# Patient Record
Sex: MALE | Race: WHITE | NOT HISPANIC OR LATINO | URBAN - METROPOLITAN AREA
[De-identification: names, ages, dates, MRNs, and addresses within clinical notes are randomized per-mention and may not be internally consistent; named-entity substitution may affect disease eponyms.]

---

## 2021-02-03 RX ORDER — AZITHROMYCIN 500 MG/1
0 TABLET, FILM COATED ORAL
Qty: 0 | Refills: 0 | DISCHARGE
Start: 2021-02-03 | End: 2021-02-07

## 2021-02-08 ENCOUNTER — INPATIENT (INPATIENT)
Facility: HOSPITAL | Age: 86
LOS: 3 days | Discharge: HOME | DRG: 177 | End: 2021-02-12
Attending: FAMILY MEDICINE | Admitting: FAMILY MEDICINE
Payer: MEDICARE

## 2021-02-08 VITALS
HEART RATE: 63 BPM | SYSTOLIC BLOOD PRESSURE: 133 MMHG | DIASTOLIC BLOOD PRESSURE: 51 MMHG | RESPIRATION RATE: 20 BRPM | TEMPERATURE: 98 F | WEIGHT: 214.95 LBS | OXYGEN SATURATION: 78 %

## 2021-02-08 DIAGNOSIS — U07.1 COVID-19: ICD-10-CM

## 2021-02-08 LAB
ANION GAP SERPL CALC-SCNC: 9 MMOL/L — SIGNIFICANT CHANGE UP (ref 5–17)
APTT BLD: 20.9 SEC — LOW (ref 27.5–35.5)
BASOPHILS # BLD AUTO: 0 K/UL — SIGNIFICANT CHANGE UP (ref 0–0.2)
BASOPHILS NFR BLD AUTO: 0 % — SIGNIFICANT CHANGE UP (ref 0–2)
BUN SERPL-MCNC: 91 MG/DL — HIGH (ref 7–23)
CALCIUM SERPL-MCNC: 9.4 MG/DL — SIGNIFICANT CHANGE UP (ref 8.5–10.1)
CHLORIDE SERPL-SCNC: 113 MMOL/L — HIGH (ref 96–108)
CK SERPL-CCNC: 59 U/L — SIGNIFICANT CHANGE UP (ref 26–308)
CO2 SERPL-SCNC: 19 MMOL/L — LOW (ref 22–31)
CREAT SERPL-MCNC: 2.6 MG/DL — HIGH (ref 0.5–1.3)
EOSINOPHIL # BLD AUTO: 0.26 K/UL — SIGNIFICANT CHANGE UP (ref 0–0.5)
EOSINOPHIL NFR BLD AUTO: 3 % — SIGNIFICANT CHANGE UP (ref 0–6)
GLUCOSE SERPL-MCNC: 199 MG/DL — HIGH (ref 70–99)
HCT VFR BLD CALC: 30.2 % — LOW (ref 39–50)
HGB BLD-MCNC: 9.5 G/DL — LOW (ref 13–17)
INR BLD: 1.04 RATIO — SIGNIFICANT CHANGE UP (ref 0.88–1.16)
LACTATE SERPL-SCNC: 2.8 MMOL/L — HIGH (ref 0.7–2)
LYMPHOCYTES # BLD AUTO: 0.85 K/UL — LOW (ref 1–3.3)
LYMPHOCYTES # BLD AUTO: 10 % — LOW (ref 13–44)
MCHC RBC-ENTMCNC: 28.3 PG — SIGNIFICANT CHANGE UP (ref 27–34)
MCHC RBC-ENTMCNC: 31.5 GM/DL — LOW (ref 32–36)
MCV RBC AUTO: 89.9 FL — SIGNIFICANT CHANGE UP (ref 80–100)
MONOCYTES # BLD AUTO: 0.77 K/UL — SIGNIFICANT CHANGE UP (ref 0–0.9)
MONOCYTES NFR BLD AUTO: 9 % — SIGNIFICANT CHANGE UP (ref 2–14)
NEUTROPHILS # BLD AUTO: 6.31 K/UL — SIGNIFICANT CHANGE UP (ref 1.8–7.4)
NEUTROPHILS NFR BLD AUTO: 72 % — SIGNIFICANT CHANGE UP (ref 43–77)
NRBC # BLD: SIGNIFICANT CHANGE UP /100 WBCS (ref 0–0)
PLATELET # BLD AUTO: 221 K/UL — SIGNIFICANT CHANGE UP (ref 150–400)
POTASSIUM SERPL-MCNC: 5 MMOL/L — SIGNIFICANT CHANGE UP (ref 3.5–5.3)
POTASSIUM SERPL-SCNC: 5 MMOL/L — SIGNIFICANT CHANGE UP (ref 3.5–5.3)
PROTHROM AB SERPL-ACNC: 12 SEC — SIGNIFICANT CHANGE UP (ref 10.6–13.6)
RBC # BLD: 3.36 M/UL — LOW (ref 4.2–5.8)
RBC # FLD: 13.6 % — SIGNIFICANT CHANGE UP (ref 10.3–14.5)
SARS-COV-2 RNA SPEC QL NAA+PROBE: DETECTED
SODIUM SERPL-SCNC: 141 MMOL/L — SIGNIFICANT CHANGE UP (ref 135–145)
WBC # BLD: 8.53 K/UL — SIGNIFICANT CHANGE UP (ref 3.8–10.5)
WBC # FLD AUTO: 8.53 K/UL — SIGNIFICANT CHANGE UP (ref 3.8–10.5)

## 2021-02-08 PROCEDURE — 82728 ASSAY OF FERRITIN: CPT

## 2021-02-08 PROCEDURE — 81001 URINALYSIS AUTO W/SCOPE: CPT

## 2021-02-08 PROCEDURE — 84466 ASSAY OF TRANSFERRIN: CPT

## 2021-02-08 PROCEDURE — 99223 1ST HOSP IP/OBS HIGH 75: CPT | Mod: CS

## 2021-02-08 PROCEDURE — 71250 CT THORAX DX C-: CPT | Mod: 26

## 2021-02-08 PROCEDURE — 85730 THROMBOPLASTIN TIME PARTIAL: CPT

## 2021-02-08 PROCEDURE — 83036 HEMOGLOBIN GLYCOSYLATED A1C: CPT

## 2021-02-08 PROCEDURE — 83550 IRON BINDING TEST: CPT

## 2021-02-08 PROCEDURE — 80053 COMPREHEN METABOLIC PANEL: CPT

## 2021-02-08 PROCEDURE — 93010 ELECTROCARDIOGRAM REPORT: CPT

## 2021-02-08 PROCEDURE — 93970 EXTREMITY STUDY: CPT | Mod: 26

## 2021-02-08 PROCEDURE — 82550 ASSAY OF CK (CPK): CPT

## 2021-02-08 PROCEDURE — 85025 COMPLETE CBC W/AUTO DIFF WBC: CPT

## 2021-02-08 PROCEDURE — 82962 GLUCOSE BLOOD TEST: CPT

## 2021-02-08 PROCEDURE — 74176 CT ABD & PELVIS W/O CONTRAST: CPT | Mod: 26

## 2021-02-08 PROCEDURE — 82746 ASSAY OF FOLIC ACID SERUM: CPT

## 2021-02-08 PROCEDURE — 84100 ASSAY OF PHOSPHORUS: CPT

## 2021-02-08 PROCEDURE — 82607 VITAMIN B-12: CPT

## 2021-02-08 PROCEDURE — 74176 CT ABD & PELVIS W/O CONTRAST: CPT

## 2021-02-08 PROCEDURE — 97116 GAIT TRAINING THERAPY: CPT | Mod: GP

## 2021-02-08 PROCEDURE — 80048 BASIC METABOLIC PNL TOTAL CA: CPT

## 2021-02-08 PROCEDURE — 36415 COLL VENOUS BLD VENIPUNCTURE: CPT

## 2021-02-08 PROCEDURE — 83540 ASSAY OF IRON: CPT

## 2021-02-08 PROCEDURE — 71045 X-RAY EXAM CHEST 1 VIEW: CPT | Mod: 26

## 2021-02-08 PROCEDURE — 86769 SARS-COV-2 COVID-19 ANTIBODY: CPT

## 2021-02-08 PROCEDURE — 93970 EXTREMITY STUDY: CPT

## 2021-02-08 PROCEDURE — 83735 ASSAY OF MAGNESIUM: CPT

## 2021-02-08 PROCEDURE — 83605 ASSAY OF LACTIC ACID: CPT

## 2021-02-08 PROCEDURE — 97163 PT EVAL HIGH COMPLEX 45 MIN: CPT | Mod: GP

## 2021-02-08 RX ORDER — CARVEDILOL PHOSPHATE 80 MG/1
1 CAPSULE, EXTENDED RELEASE ORAL
Qty: 0 | Refills: 0 | DISCHARGE

## 2021-02-08 RX ORDER — IPRATROPIUM BROMIDE 0.2 MG/ML
4 SOLUTION, NON-ORAL INHALATION ONCE
Refills: 0 | Status: COMPLETED | OUTPATIENT
Start: 2021-02-08 | End: 2021-02-08

## 2021-02-08 RX ORDER — SODIUM CHLORIDE 9 MG/ML
1000 INJECTION INTRAMUSCULAR; INTRAVENOUS; SUBCUTANEOUS
Refills: 0 | Status: DISCONTINUED | OUTPATIENT
Start: 2021-02-08 | End: 2021-02-09

## 2021-02-08 RX ORDER — PIOGLITAZONE HYDROCHLORIDE 15 MG/1
1 TABLET ORAL
Qty: 0 | Refills: 0 | DISCHARGE

## 2021-02-08 RX ORDER — TRAVOPROST 0.04 MG/ML
1 SOLUTION/ DROPS OPHTHALMIC
Qty: 0 | Refills: 0 | DISCHARGE

## 2021-02-08 RX ORDER — TIMOLOL 0.5 %
1 DROPS OPHTHALMIC (EYE)
Qty: 0 | Refills: 0 | DISCHARGE

## 2021-02-08 RX ORDER — DEXTROSE 50 % IN WATER 50 %
12.5 SYRINGE (ML) INTRAVENOUS ONCE
Refills: 0 | Status: DISCONTINUED | OUTPATIENT
Start: 2021-02-08 | End: 2021-02-12

## 2021-02-08 RX ORDER — SODIUM CHLORIDE 9 MG/ML
1000 INJECTION, SOLUTION INTRAVENOUS
Refills: 0 | Status: DISCONTINUED | OUTPATIENT
Start: 2021-02-08 | End: 2021-02-12

## 2021-02-08 RX ORDER — ALBUTEROL 90 UG/1
1 AEROSOL, METERED ORAL EVERY 4 HOURS
Refills: 0 | Status: DISCONTINUED | OUTPATIENT
Start: 2021-02-08 | End: 2021-02-12

## 2021-02-08 RX ORDER — INSULIN LISPRO 100/ML
VIAL (ML) SUBCUTANEOUS
Refills: 0 | Status: DISCONTINUED | OUTPATIENT
Start: 2021-02-08 | End: 2021-02-10

## 2021-02-08 RX ORDER — ACETAMINOPHEN 500 MG
650 TABLET ORAL EVERY 4 HOURS
Refills: 0 | Status: DISCONTINUED | OUTPATIENT
Start: 2021-02-08 | End: 2021-02-12

## 2021-02-08 RX ORDER — CARVEDILOL PHOSPHATE 80 MG/1
6.25 CAPSULE, EXTENDED RELEASE ORAL EVERY 12 HOURS
Refills: 0 | Status: DISCONTINUED | OUTPATIENT
Start: 2021-02-08 | End: 2021-02-12

## 2021-02-08 RX ORDER — ATORVASTATIN CALCIUM 80 MG/1
1 TABLET, FILM COATED ORAL
Qty: 0 | Refills: 0 | DISCHARGE

## 2021-02-08 RX ORDER — ALBUTEROL 90 UG/1
4 AEROSOL, METERED ORAL ONCE
Refills: 0 | Status: COMPLETED | OUTPATIENT
Start: 2021-02-08 | End: 2021-02-08

## 2021-02-08 RX ORDER — ATORVASTATIN CALCIUM 80 MG/1
20 TABLET, FILM COATED ORAL AT BEDTIME
Refills: 0 | Status: DISCONTINUED | OUTPATIENT
Start: 2021-02-08 | End: 2021-02-12

## 2021-02-08 RX ORDER — TIMOLOL 0.5 %
1 DROPS OPHTHALMIC (EYE) DAILY
Refills: 0 | Status: DISCONTINUED | OUTPATIENT
Start: 2021-02-08 | End: 2021-02-12

## 2021-02-08 RX ORDER — GLUCAGON INJECTION, SOLUTION 0.5 MG/.1ML
1 INJECTION, SOLUTION SUBCUTANEOUS ONCE
Refills: 0 | Status: DISCONTINUED | OUTPATIENT
Start: 2021-02-08 | End: 2021-02-12

## 2021-02-08 RX ORDER — DEXTROSE 50 % IN WATER 50 %
25 SYRINGE (ML) INTRAVENOUS ONCE
Refills: 0 | Status: DISCONTINUED | OUTPATIENT
Start: 2021-02-08 | End: 2021-02-12

## 2021-02-08 RX ORDER — HEPARIN SODIUM 5000 [USP'U]/ML
5000 INJECTION INTRAVENOUS; SUBCUTANEOUS EVERY 12 HOURS
Refills: 0 | Status: DISCONTINUED | OUTPATIENT
Start: 2021-02-08 | End: 2021-02-09

## 2021-02-08 RX ORDER — LATANOPROST 0.05 MG/ML
1 SOLUTION/ DROPS OPHTHALMIC; TOPICAL AT BEDTIME
Refills: 0 | Status: DISCONTINUED | OUTPATIENT
Start: 2021-02-08 | End: 2021-02-12

## 2021-02-08 RX ORDER — DEXTROSE 50 % IN WATER 50 %
15 SYRINGE (ML) INTRAVENOUS ONCE
Refills: 0 | Status: DISCONTINUED | OUTPATIENT
Start: 2021-02-08 | End: 2021-02-12

## 2021-02-08 RX ORDER — SODIUM CHLORIDE 9 MG/ML
1000 INJECTION INTRAMUSCULAR; INTRAVENOUS; SUBCUTANEOUS ONCE
Refills: 0 | Status: COMPLETED | OUTPATIENT
Start: 2021-02-08 | End: 2021-02-08

## 2021-02-08 RX ORDER — DEXAMETHASONE 0.5 MG/5ML
6 ELIXIR ORAL DAILY
Refills: 0 | Status: DISCONTINUED | OUTPATIENT
Start: 2021-02-08 | End: 2021-02-10

## 2021-02-08 RX ORDER — ASPIRIN/CALCIUM CARB/MAGNESIUM 324 MG
81 TABLET ORAL DAILY
Refills: 0 | Status: DISCONTINUED | OUTPATIENT
Start: 2021-02-08 | End: 2021-02-12

## 2021-02-08 RX ORDER — INSULIN LISPRO 100/ML
VIAL (ML) SUBCUTANEOUS AT BEDTIME
Refills: 0 | Status: DISCONTINUED | OUTPATIENT
Start: 2021-02-08 | End: 2021-02-10

## 2021-02-08 RX ORDER — ASPIRIN/CALCIUM CARB/MAGNESIUM 324 MG
1 TABLET ORAL
Qty: 0 | Refills: 0 | DISCHARGE

## 2021-02-08 RX ADMIN — ALBUTEROL 4 PUFF(S): 90 AEROSOL, METERED ORAL at 16:05

## 2021-02-08 RX ADMIN — CARVEDILOL PHOSPHATE 6.25 MILLIGRAM(S): 80 CAPSULE, EXTENDED RELEASE ORAL at 21:09

## 2021-02-08 RX ADMIN — SODIUM CHLORIDE 1000 MILLILITER(S): 9 INJECTION INTRAMUSCULAR; INTRAVENOUS; SUBCUTANEOUS at 17:06

## 2021-02-08 RX ADMIN — HEPARIN SODIUM 5000 UNIT(S): 5000 INJECTION INTRAVENOUS; SUBCUTANEOUS at 21:09

## 2021-02-08 RX ADMIN — ATORVASTATIN CALCIUM 20 MILLIGRAM(S): 80 TABLET, FILM COATED ORAL at 21:09

## 2021-02-08 RX ADMIN — LATANOPROST 1 DROP(S): 0.05 SOLUTION/ DROPS OPHTHALMIC; TOPICAL at 23:00

## 2021-02-08 RX ADMIN — SODIUM CHLORIDE 75 MILLILITER(S): 9 INJECTION INTRAMUSCULAR; INTRAVENOUS; SUBCUTANEOUS at 21:09

## 2021-02-08 RX ADMIN — Medication 4 PUFF(S): at 16:05

## 2021-02-08 RX ADMIN — SODIUM CHLORIDE 1000 MILLILITER(S): 9 INJECTION INTRAMUSCULAR; INTRAVENOUS; SUBCUTANEOUS at 18:06

## 2021-02-08 NOTE — ED PROVIDER NOTE - OBJECTIVE STATEMENT
84 y/o male with a PMHx of HTN, DM, presents to the ED sent by PCP c/o worsening cough x2 weeks. Pt reports loss of taste and smell. Notes baseline SOB. Pt notes wife at home tested COVID +. Sent to the ED by PCP for chest XR. 02 sat in triage 78% on RA. Denies fever, chest pain. No other complaints at this time. NKDA. 86 y/o male with a PMHx of HTN, DM, presents to the ED sent by PCP c/o worsening cough x2 weeks. Pt reports loss of taste and smell, decreased PO intake, and generalized weakness. Notes baseline SOB. Pt notes wife at home tested COVID +. Sent to the ED by PCP for chest XR. Denies fever, chest pain. No other complaints at this time. NKDA.

## 2021-02-08 NOTE — ED PROVIDER NOTE - CHPI ED SYMPTOMS POS
+loss of taste and smell/COUGH/SHORTNESS OF BREATH +loss of taste and smell +decreased PO intake +generalized weakness/COUGH/SHORTNESS OF BREATH

## 2021-02-08 NOTE — H&P ADULT - ASSESSMENT
86 yo male with PMH of DM2, HTN, HLD admitted with:    #probable COVID 19 PNA  #Acute hypoxic respiratory failure (SpO2 78% on RA documented on arrival to ED)  - admit to med-surg  - currently SpO2 93-94% on RA, O2 via NC PRN to maintain SPO2 >92%  - start PO decadron  - no remdesevir for now given CrCl <30  - ID consult  - lactic acidosis noted, trend lactate (?sepsis from COVID with decreased clearance given OPAL vs metformin use)  - d-dimer 91296, check LE dopplers  - f/u procalcitonin    #OPAL  - hold losartan  - IV fluids  - nephro consult  - CT abdomen pending to r/o obstruction  - UA  - avoid nephrotoxic medications    #normocytic anemia  - monitor H/H  - stool occult negative  - check iron panel, B12, folate    #DM2  - hold metformin  - ISS  - hold actos    #HTN  - losartan held secondary to OPAL  - continue coreg    #DVT prophylaxis  - heparin sq    #Advance Directives  - FULL CODE, discussed with patient      IMPROVE VTE Individual Risk Assessment    RISK                                                                Points    [  ] Previous VTE                                                  3    [  ] Thrombophilia                                               2    [  ] Lower limb paralysis                                      2        (unable to hold up >15 seconds)      [  ] Current Cancer                                              2         (within 6 months)    [  ] Immobilization > 24 hrs                                1    [  ] ICU/CCU stay > 24 hours                              1    [x  ] Age > 60                                                      1    IMPROVE VTE Score ___1______    IMPROVE Score 0-1: Low Risk, No VTE prophylaxis required for most patients, encourage ambulation.   IMPROVE Score 2-3: At risk, pharmacologic VTE prophylaxis is indicated for most patients (in the absence of a contraindication)  IMPROVE Score > or = 4: High Risk, pharmacologic VTE prophylaxis is indicated for most patients (in the absence of a contraindication)

## 2021-02-08 NOTE — H&P ADULT - NSHPPHYSICALEXAM_GEN_ALL_CORE
Vital Signs Last 24 Hrs  T(C): 37.2 (08 Feb 2021 15:24), Max: 37.2 (08 Feb 2021 15:24)  T(F): 98.9 (08 Feb 2021 15:24), Max: 98.9 (08 Feb 2021 15:24)  HR: 84 (08 Feb 2021 15:24) (63 - 84)  BP: 133/51 (08 Feb 2021 15:24) (133/51 - 133/51)  BP(mean): --  RR: 19 (08 Feb 2021 15:25) (19 - 20)  SpO2: 96% (08 Feb 2021 15:25) (78% - 99%)

## 2021-02-08 NOTE — H&P ADULT - HISTORY OF PRESENT ILLNESS
86 yo male with PMH of DM2, HTN, HLD presents to ED with complaint of weakness and cough. Pt states he has been having a cough and low grade fevers since January. Feels mild SOB. Cough is non productive. States fever at home was 101. He had a decreased appetite but states has improved in the last 2 days. Has loss of taste and smell. Wife at home positive with COVID. Son also recently had COVID. Pt states he was drinking water at home but maybe not enough. No trouble urinating. No dark urine. Denies any abd pain, N/V/D. No melena or hematochezia. Pt states overall feels very weak and tired.     In the ED patient with no fever. There was a documented SpO2 of 78% on RA. On my exam patient with no O2 on and O2 sat 93-94% at rest. He was also noted to be anemic with hgb of 9.5 (guaiac negative) and OPAL with BUN/Cr of 91/2.6. He was given 1L of NS. CXR shows infiltrates. CT chest shows ground glass opacification. ER spoke with PCP in [facility] Dr. Bandar Larry ([phone]) and stated patient with no history of CKD or anemia in the past.

## 2021-02-08 NOTE — H&P ADULT - NSHPSOCIALHISTORY_GEN_ALL_CORE
former cigar smoker, quit 10 yrs ago  former drinker quit 6 yrs ago  no drug use  lives at home with wife

## 2021-02-08 NOTE — ED PROVIDER NOTE - CLINICAL SUMMARY MEDICAL DECISION MAKING FREE TEXT BOX
Pt with COVID + spouse, sick for 2 weeks with worsening cough, decreased oral intake, weakness. Sent in by PMD for chest XR. Pt now found to be in renal failure, anemic, with right-sided infiltrate. Labs, hydration, admission.

## 2021-02-08 NOTE — ED ADULT TRIAGE NOTE - CHIEF COMPLAINT QUOTE
Patient presents in ED with worsening cough and congestion. Patient sent by MD to r/o Pneumonia. Patient family Covid positive. 02 saturation in triage 78% on room air. Patient direct bedded into main.

## 2021-02-08 NOTE — ED PROVIDER NOTE - CARE PLAN
Principal Discharge DX:	COVID-19  Secondary Diagnosis:	OPAL (acute kidney injury)  Secondary Diagnosis:	Anemia

## 2021-02-08 NOTE — ED ADULT NURSE REASSESSMENT NOTE - NS ED NURSE REASSESS COMMENT FT1
report received from Murtaza miller. pt in no acute distress at this time. on the monitor, will sejal

## 2021-02-08 NOTE — ED PROVIDER NOTE - PROGRESS NOTE DETAILS
84 y/o M presents with cough x 2 weeks. Pt reports persistent cough for the last 2 weeks. He had a fever initially which has since subsided. +Loss of taste/smell. pt feels baseline SOB. Wife + with covid at home. Sent to ED by PMD for cxr. Denies fever/chills, n/v/d, Cp, abd, pain, HA, dizziness, leg swelling or other complaints at this time.  Gen: well appearing elderly male, A+ox3, heart:[id number]. Lung CTA B/l. abd: soft, nd, nttp. Ext: No pedal edema. -Magno Alegria PA-C I ambulated pt, O2 sat maintained at % on RA. -Magno Alegria PA-C I Zacarias Christopher attest that this documentation has been prepared under the direction and in the presence of Doctor Michael. Leatha Christopher for attending Dr. Michael: wet read Chest XR: +right-sided infiltrate Leatha Christopher for attending Dr. Michael: Spoke to PCP who is in [place], Dr. Bandar Larry ([phone]), states pt has no Hx of kidney disease, no Hx of low hemoglobin. Pt pulse ox reportedly 79% at home. Leatha Christopher for attending Dr. Clements: wet read Chest XR: +right-sided infiltrate Leatha Christopher for attending Dr. Clements: Spoke to PCP who is in [place], Dr. Bandar Larry ([phone]), states pt has no Hx of kidney disease, no Hx of low hemoglobin. Pt pulse ox reportedly 79% at home. I Zacarias Christopher attest that this documentation has been prepared under the direction and in the presence of Doctor Clements Leatha Christopher for attending Dr. Clements: Rectal exam: Lot [id number], expiration 09/30/2021: Guaiac negative, QC +. Case discussed with Dr. hung who will admit pt. -Magno Alegria PA-C Received call from Dr. Sotelo that pt has left gastroc dvt. Dr. Wolff called to [facility]. Pt is already enroute to his room. Alec ALVAREZ

## 2021-02-09 PROBLEM — Z00.00 ENCOUNTER FOR PREVENTIVE HEALTH EXAMINATION: Status: ACTIVE | Noted: 2021-02-09

## 2021-02-09 LAB
A1C WITH ESTIMATED AVERAGE GLUCOSE RESULT: 6.2 % — HIGH (ref 4–5.6)
ALBUMIN SERPL ELPH-MCNC: 2 G/DL — LOW (ref 3.3–5)
ALP SERPL-CCNC: 73 U/L — SIGNIFICANT CHANGE UP (ref 40–120)
ALT FLD-CCNC: 32 U/L — SIGNIFICANT CHANGE UP (ref 12–78)
ANION GAP SERPL CALC-SCNC: 8 MMOL/L — SIGNIFICANT CHANGE UP (ref 5–17)
APPEARANCE UR: CLEAR — SIGNIFICANT CHANGE UP
APTT BLD: 157.1 SEC — CRITICAL HIGH (ref 27.5–35.5)
AST SERPL-CCNC: 51 U/L — HIGH (ref 15–37)
BASOPHILS # BLD AUTO: 0.02 K/UL — SIGNIFICANT CHANGE UP (ref 0–0.2)
BASOPHILS NFR BLD AUTO: 0.3 % — SIGNIFICANT CHANGE UP (ref 0–2)
BILIRUB SERPL-MCNC: 0.3 MG/DL — SIGNIFICANT CHANGE UP (ref 0.2–1.2)
BILIRUB UR-MCNC: NEGATIVE — SIGNIFICANT CHANGE UP
BUN SERPL-MCNC: 76 MG/DL — HIGH (ref 7–23)
CALCIUM SERPL-MCNC: 8.4 MG/DL — LOW (ref 8.5–10.1)
CHLORIDE SERPL-SCNC: 118 MMOL/L — HIGH (ref 96–108)
CO2 SERPL-SCNC: 19 MMOL/L — LOW (ref 22–31)
COLOR SPEC: YELLOW — SIGNIFICANT CHANGE UP
CREAT SERPL-MCNC: 1.93 MG/DL — HIGH (ref 0.5–1.3)
CRP SERPL-MCNC: 2.02 MG/DL — HIGH (ref 0–0.4)
DIFF PNL FLD: NEGATIVE — SIGNIFICANT CHANGE UP
EOSINOPHIL # BLD AUTO: 0.01 K/UL — SIGNIFICANT CHANGE UP (ref 0–0.5)
EOSINOPHIL NFR BLD AUTO: 0.2 % — SIGNIFICANT CHANGE UP (ref 0–6)
ESTIMATED AVERAGE GLUCOSE: 131 MG/DL — HIGH (ref 68–114)
FERRITIN SERPL-MCNC: 774 NG/ML — HIGH (ref 30–400)
FERRITIN SERPL-MCNC: 775 NG/ML — HIGH (ref 30–400)
FOLATE SERPL-MCNC: 9.2 NG/ML — SIGNIFICANT CHANGE UP
GLUCOSE SERPL-MCNC: 179 MG/DL — HIGH (ref 70–99)
GLUCOSE UR QL: NEGATIVE MG/DL — SIGNIFICANT CHANGE UP
HCT VFR BLD CALC: 25.8 % — LOW (ref 39–50)
HGB BLD-MCNC: 8.1 G/DL — LOW (ref 13–17)
IMM GRANULOCYTES NFR BLD AUTO: 7.4 % — HIGH (ref 0–1.5)
IRON SATN MFR SERPL: 20 % — SIGNIFICANT CHANGE UP (ref 16–55)
IRON SATN MFR SERPL: 40 UG/DL — LOW (ref 45–165)
KETONES UR-MCNC: NEGATIVE — SIGNIFICANT CHANGE UP
LEUKOCYTE ESTERASE UR-ACNC: NEGATIVE — SIGNIFICANT CHANGE UP
LYMPHOCYTES # BLD AUTO: 0.63 K/UL — LOW (ref 1–3.3)
LYMPHOCYTES # BLD AUTO: 9.8 % — LOW (ref 13–44)
MAGNESIUM SERPL-MCNC: 2 MG/DL — SIGNIFICANT CHANGE UP (ref 1.6–2.6)
MCHC RBC-ENTMCNC: 28.1 PG — SIGNIFICANT CHANGE UP (ref 27–34)
MCHC RBC-ENTMCNC: 31.4 GM/DL — LOW (ref 32–36)
MCV RBC AUTO: 89.6 FL — SIGNIFICANT CHANGE UP (ref 80–100)
MONOCYTES # BLD AUTO: 0.21 K/UL — SIGNIFICANT CHANGE UP (ref 0–0.9)
MONOCYTES NFR BLD AUTO: 3.3 % — SIGNIFICANT CHANGE UP (ref 2–14)
NEUTROPHILS # BLD AUTO: 5.11 K/UL — SIGNIFICANT CHANGE UP (ref 1.8–7.4)
NEUTROPHILS NFR BLD AUTO: 79 % — HIGH (ref 43–77)
NITRITE UR-MCNC: NEGATIVE — SIGNIFICANT CHANGE UP
PH UR: 6 — SIGNIFICANT CHANGE UP (ref 5–8)
PHOSPHATE SERPL-MCNC: 3.4 MG/DL — SIGNIFICANT CHANGE UP (ref 2.5–4.5)
PLATELET # BLD AUTO: 200 K/UL — SIGNIFICANT CHANGE UP (ref 150–400)
POTASSIUM SERPL-MCNC: 4.7 MMOL/L — SIGNIFICANT CHANGE UP (ref 3.5–5.3)
POTASSIUM SERPL-SCNC: 4.7 MMOL/L — SIGNIFICANT CHANGE UP (ref 3.5–5.3)
PROCALCITONIN SERPL-MCNC: 0.71 NG/ML — HIGH (ref 0.02–0.1)
PROT SERPL-MCNC: 5.8 GM/DL — LOW (ref 6–8.3)
PROT UR-MCNC: 15 MG/DL
RBC # BLD: 2.88 M/UL — LOW (ref 4.2–5.8)
RBC # FLD: 13.5 % — SIGNIFICANT CHANGE UP (ref 10.3–14.5)
SARS-COV-2 IGG SERPL QL IA: POSITIVE
SARS-COV-2 IGM SERPL IA-ACNC: 14.1 INDEX — HIGH
SODIUM SERPL-SCNC: 145 MMOL/L — SIGNIFICANT CHANGE UP (ref 135–145)
SP GR SPEC: 1.02 — SIGNIFICANT CHANGE UP (ref 1.01–1.02)
TIBC SERPL-MCNC: 199 UG/DL — LOW (ref 220–430)
TRANSFERRIN SERPL-MCNC: 165 MG/DL — LOW (ref 200–360)
UIBC SERPL-MCNC: 160 UG/DL — SIGNIFICANT CHANGE UP (ref 110–370)
UROBILINOGEN FLD QL: NEGATIVE MG/DL — SIGNIFICANT CHANGE UP
VIT B12 SERPL-MCNC: 971 PG/ML — SIGNIFICANT CHANGE UP (ref 232–1245)
WBC # BLD: 6.46 K/UL — SIGNIFICANT CHANGE UP (ref 3.8–10.5)
WBC # FLD AUTO: 6.46 K/UL — SIGNIFICANT CHANGE UP (ref 3.8–10.5)

## 2021-02-09 PROCEDURE — 99233 SBSQ HOSP IP/OBS HIGH 50: CPT | Mod: CS

## 2021-02-09 RX ORDER — HEPARIN SODIUM 5000 [USP'U]/ML
3500 INJECTION INTRAVENOUS; SUBCUTANEOUS EVERY 6 HOURS
Refills: 0 | Status: DISCONTINUED | OUTPATIENT
Start: 2021-02-09 | End: 2021-02-09

## 2021-02-09 RX ORDER — HEPARIN SODIUM 5000 [USP'U]/ML
8000 INJECTION INTRAVENOUS; SUBCUTANEOUS EVERY 6 HOURS
Refills: 0 | Status: DISCONTINUED | OUTPATIENT
Start: 2021-02-09 | End: 2021-02-09

## 2021-02-09 RX ORDER — APIXABAN 2.5 MG/1
5 TABLET, FILM COATED ORAL
Refills: 0 | Status: DISCONTINUED | OUTPATIENT
Start: 2021-02-09 | End: 2021-02-09

## 2021-02-09 RX ORDER — HEPARIN SODIUM 5000 [USP'U]/ML
7000 INJECTION INTRAVENOUS; SUBCUTANEOUS ONCE
Refills: 0 | Status: COMPLETED | OUTPATIENT
Start: 2021-02-09 | End: 2021-02-09

## 2021-02-09 RX ORDER — HEPARIN SODIUM 5000 [USP'U]/ML
8000 INJECTION INTRAVENOUS; SUBCUTANEOUS ONCE
Refills: 0 | Status: DISCONTINUED | OUTPATIENT
Start: 2021-02-09 | End: 2021-02-09

## 2021-02-09 RX ORDER — APIXABAN 2.5 MG/1
5 TABLET, FILM COATED ORAL
Refills: 0 | Status: CANCELLED | OUTPATIENT
Start: 2021-02-16 | End: 2021-02-12

## 2021-02-09 RX ORDER — HEPARIN SODIUM 5000 [USP'U]/ML
4000 INJECTION INTRAVENOUS; SUBCUTANEOUS EVERY 6 HOURS
Refills: 0 | Status: DISCONTINUED | OUTPATIENT
Start: 2021-02-09 | End: 2021-02-09

## 2021-02-09 RX ORDER — HEPARIN SODIUM 5000 [USP'U]/ML
INJECTION INTRAVENOUS; SUBCUTANEOUS
Qty: 25000 | Refills: 0 | Status: DISCONTINUED | OUTPATIENT
Start: 2021-02-09 | End: 2021-02-09

## 2021-02-09 RX ORDER — HEPARIN SODIUM 5000 [USP'U]/ML
7000 INJECTION INTRAVENOUS; SUBCUTANEOUS EVERY 6 HOURS
Refills: 0 | Status: DISCONTINUED | OUTPATIENT
Start: 2021-02-09 | End: 2021-02-09

## 2021-02-09 RX ORDER — SODIUM CHLORIDE 9 MG/ML
1000 INJECTION INTRAMUSCULAR; INTRAVENOUS; SUBCUTANEOUS
Refills: 0 | Status: DISCONTINUED | OUTPATIENT
Start: 2021-02-09 | End: 2021-02-10

## 2021-02-09 RX ORDER — APIXABAN 2.5 MG/1
10 TABLET, FILM COATED ORAL EVERY 12 HOURS
Refills: 0 | Status: DISCONTINUED | OUTPATIENT
Start: 2021-02-09 | End: 2021-02-12

## 2021-02-09 RX ADMIN — HEPARIN SODIUM 1300 UNIT(S)/HR: 5000 INJECTION INTRAVENOUS; SUBCUTANEOUS at 14:24

## 2021-02-09 RX ADMIN — HEPARIN SODIUM 1600 UNIT(S)/HR: 5000 INJECTION INTRAVENOUS; SUBCUTANEOUS at 02:03

## 2021-02-09 RX ADMIN — Medication 1: at 17:18

## 2021-02-09 RX ADMIN — Medication 2: at 08:07

## 2021-02-09 RX ADMIN — HEPARIN SODIUM 7000 UNIT(S): 5000 INJECTION INTRAVENOUS; SUBCUTANEOUS at 02:04

## 2021-02-09 RX ADMIN — APIXABAN 10 MILLIGRAM(S): 2.5 TABLET, FILM COATED ORAL at 20:57

## 2021-02-09 RX ADMIN — Medication 1 DROP(S): at 13:13

## 2021-02-09 RX ADMIN — Medication 6 MILLIGRAM(S): at 14:28

## 2021-02-09 RX ADMIN — Medication 2: at 20:56

## 2021-02-09 RX ADMIN — CARVEDILOL PHOSPHATE 6.25 MILLIGRAM(S): 80 CAPSULE, EXTENDED RELEASE ORAL at 12:00

## 2021-02-09 RX ADMIN — ATORVASTATIN CALCIUM 20 MILLIGRAM(S): 80 TABLET, FILM COATED ORAL at 20:57

## 2021-02-09 RX ADMIN — CARVEDILOL PHOSPHATE 6.25 MILLIGRAM(S): 80 CAPSULE, EXTENDED RELEASE ORAL at 20:58

## 2021-02-09 RX ADMIN — HEPARIN SODIUM 0 UNIT(S)/HR: 5000 INJECTION INTRAVENOUS; SUBCUTANEOUS at 13:15

## 2021-02-09 RX ADMIN — LATANOPROST 1 DROP(S): 0.05 SOLUTION/ DROPS OPHTHALMIC; TOPICAL at 20:57

## 2021-02-09 RX ADMIN — Medication 1: at 11:58

## 2021-02-09 RX ADMIN — Medication 81 MILLIGRAM(S): at 12:00

## 2021-02-09 NOTE — CONSULT NOTE ADULT - SUBJECTIVE AND OBJECTIVE BOX
Patient is a 85y old  Male who presents with a chief complaint of OPAL     HPI:  84 y/o male with h/o DM type 2, HTN, HLD was admitted on  for increased weakness and cough. Pt states he has been having a cough and low grade fevers x 1-2 weeks. Feels mild SOB. Cough is non productive. States fever at home was 101F. He had a decreased appetite. Has loss of taste and smell. Pt states overall feels very weak and tired. In the ED patient was a documented SpO2 of 78% on RA.     ER spoke with PCP in [facility] Dr. Bandar Larry ([phone]) and stated patient with no history of CKD or anemia in the past.     PMH: as above  PSH: as above  Meds: per reconciliation sheet, noted below  MEDICATIONS  (STANDING):  ALBUTerol    90 MICROgram(s) HFA Inhaler 1 Puff(s) Inhalation every 4 hours  aspirin enteric coated 81 milliGRAM(s) Oral daily  atorvastatin 20 milliGRAM(s) Oral at bedtime  carvedilol 6.25 milliGRAM(s) Oral every 12 hours  dexAMETHasone     Tablet 6 milliGRAM(s) Oral daily  dextrose 40% Gel 15 Gram(s) Oral once  dextrose 5%. 1000 milliLiter(s) (50 mL/Hr) IV Continuous <Continuous>  dextrose 5%. 1000 milliLiter(s) (100 mL/Hr) IV Continuous <Continuous>  dextrose 50% Injectable 25 Gram(s) IV Push once  dextrose 50% Injectable 12.5 Gram(s) IV Push once  dextrose 50% Injectable 25 Gram(s) IV Push once  glucagon  Injectable 1 milliGRAM(s) IntraMuscular once  heparin  Infusion.  Unit(s)/Hr (16 mL/Hr) IV Continuous <Continuous>  insulin lispro (ADMELOG) corrective regimen sliding scale   SubCutaneous three times a day before meals  insulin lispro (ADMELOG) corrective regimen sliding scale   SubCutaneous at bedtime  latanoprost 0.005% Ophthalmic Solution 1 Drop(s) Both EYES at bedtime  sodium chloride 0.9%. 1000 milliLiter(s) (75 mL/Hr) IV Continuous <Continuous>  timolol 0.5% Solution 1 Drop(s) Right EYE daily    MEDICATIONS  (PRN):  acetaminophen   Tablet .. 650 milliGRAM(s) Oral every 4 hours PRN Temp greater or equal to 38C (100.4F), Mild Pain (1 - 3)  heparin   Injectable 7000 Unit(s) IV Push every 6 hours PRN For aPTT less than 40  heparin   Injectable 3500 Unit(s) IV Push every 6 hours PRN For aPTT between 40 - 57    Allergies    No Known Allergies    Intolerances      Social: no smoking, no alcohol, no illegal drugs; no recent travel, no exposure to TB  Wife at home positive with COVID. Son also recently had COVID.   FAMILY HISTORY:  No pertinent family history in first degree relatives      no history of premature cardiovascular disease in first degree relatives    ROS: the patient denies fever, no chills, no HA, no seizures, no dizziness, no sore throat, no nasal congestion, no blurry vision, no CP, no palpitations, has SOB, has cough, no abdominal pain, no diarrhea, no N/V, no dysuria, no leg pain, no claudication, no rash, no joint aches, no rectal pain or bleeding, no night sweats  All other systems reviewed and are negative    Vital Signs Last 24 Hrs  T(C): 36.2 (2021 01:25), Max: 37.2 (2021 15:24)  T(F): 97.1 (2021 01:25), Max: 98.9 (2021 15:24)  HR: 74 (2021 01:25) (63 - 95)  BP: 155/71 (2021 01:25) (133/51 - 158/62)  BP(mean): 87 (2021 20:37) (87 - 87)  RR: 19 (2021 01:25) (19 - 21)  SpO2: 99% (2021 01:25) (78% - 99%)  Daily     Daily     PE:    Constitutional:  No acute distress  HEENT: NC/AT, EOMI, PERRLA, conjunctivae clear; ears and nose atraumatic; pharynx benign  Neck: supple; thyroid not palpable  Back: no tenderness  Respiratory: respiratory effort normal; crackles b/l  Cardiovascular: S1S2 regular, no murmurs  Abdomen: soft, not tender, not distended, positive BS; no liver or spleen organomegaly  Genitourinary: no suprapubic tenderness  Lymphatic: no LN palpable  Musculoskeletal: no muscle tenderness, no joint swelling or tenderness  Extremities: no pedal edema  Neurological/ Psychiatric: AxOx3, judgement and insight normal; moving all extremities  Skin: no rashes; no palpable lesions    Labs: all available labs reviewed                        8.1    6.46  )-----------( 200      ( 2021 07:41 )             25.8     02-    145  |  118<H>  |  76<H>  ----------------------------<  179<H>  4.7   |  19<L>  |  1.93<H>    Ca    8.4<L>      2021 07:41  Phos  3.4       Mg     2.0         TPro  5.8<L>  /  Alb  2.0<L>  /  TBili  0.3  /  DBili  x   /  AST  51<H>  /  ALT  32  /  AlkPhos  73       LIVER FUNCTIONS - ( 2021 07:41 )  Alb: 2.0 g/dL / Pro: 5.8 gm/dL / ALK PHOS: 73 U/L / ALT: 32 U/L / AST: 51 U/L / GGT: x           Urinalysis Basic - ( 2021 05:46 )    Color: Yellow / Appearance: Clear / S.020 / pH: x  Gluc: x / Ketone: Negative  / Bili: Negative / Urobili: Negative mg/dL   Blood: x / Protein: 15 mg/dL / Nitrite: Negative   Leuk Esterase: Negative / RBC: Negative /HPF / WBC Negative   Sq Epi: x / Non Sq Epi: Moderate / Bacteria: Many    COVID-19 PCR: Detected (21 @ 16:43)      Radiology: all available radiological tests reviewed    < from: CT Abdomen and Pelvis No Cont (21 @ 20:52) >  Bilateral groundglass opacities in the lung consistent with COVID 19.  No evidence of hydronephrosis or hydroureter.  Fat-containing periumbilical hernia and bilateral inguinal hernias (RIGHT greater than LEFT).  Elongated linear calcification within the SMA approximately 3 cm from its origin.    < end of copied text >      Advanced directives addressed: full resuscitation
NEPHROLOGY CONSULT  HPI:  84 yo male with PMH of DM2, HTN, HLD presents to ED with complaint of weakness and cough. Pt states he has been having a cough and low grade fevers since January. Feels mild SOB. Cough is non productive. States fever at home was 101. He had a decreased appetite but states has improved in the last 2 days. Has loss of taste and smell. Wife at home positive with COVID. Son also recently had COVID. Pt states he was drinking water at home but maybe not enough. No trouble urinating. No dark urine. Denies any abd pain, N/V/D. No melena or hematochezia. Pt states overall feels very weak and tired.     In the ED patient with no fever. There was a documented SpO2 of 78% on RA. On my exam patient with no O2 on and O2 sat 93-94% at rest. He was also noted to be anemic with hgb of 9.5 (guaiac negative) and OPAL with BUN/Cr of 91/2.6. He was given 1L of NS. CXR shows infiltrates. CT chest shows ground glass opacification. ER spoke with PCP in [facility] Dr. Bandar Larry ([phone]) and stated patient with no history of CKD or anemia in the past.  (2021 19:09)  Above from chart:  Pt asymptomatic, no sob, no cough at this time  excellent UO,VVS  I spoke to Dr Larry as well, he will call back with details , he did not have access to his chart      PAST MEDICAL & SURGICAL HISTORY:  Hyperlipidemia    Hypertension    Type 2 diabetes mellitus        FAMILY HISTORY:  No pertinent family history in first degree relatives        MEDICATIONS  (STANDING):  ALBUTerol    90 MICROgram(s) HFA Inhaler 1 Puff(s) Inhalation every 4 hours  aspirin enteric coated 81 milliGRAM(s) Oral daily  atorvastatin 20 milliGRAM(s) Oral at bedtime  carvedilol 6.25 milliGRAM(s) Oral every 12 hours  dexAMETHasone     Tablet 6 milliGRAM(s) Oral daily  dextrose 40% Gel 15 Gram(s) Oral once  dextrose 5%. 1000 milliLiter(s) (50 mL/Hr) IV Continuous <Continuous>  dextrose 5%. 1000 milliLiter(s) (100 mL/Hr) IV Continuous <Continuous>  dextrose 50% Injectable 25 Gram(s) IV Push once  dextrose 50% Injectable 12.5 Gram(s) IV Push once  dextrose 50% Injectable 25 Gram(s) IV Push once  glucagon  Injectable 1 milliGRAM(s) IntraMuscular once  heparin  Infusion.  Unit(s)/Hr (16 mL/Hr) IV Continuous <Continuous>  insulin lispro (ADMELOG) corrective regimen sliding scale   SubCutaneous three times a day before meals  insulin lispro (ADMELOG) corrective regimen sliding scale   SubCutaneous at bedtime  latanoprost 0.005% Ophthalmic Solution 1 Drop(s) Both EYES at bedtime  sodium chloride 0.9%. 1000 milliLiter(s) (75 mL/Hr) IV Continuous <Continuous>  timolol 0.5% Solution 1 Drop(s) Right EYE daily    MEDICATIONS  (PRN):  acetaminophen   Tablet .. 650 milliGRAM(s) Oral every 4 hours PRN Temp greater or equal to 38C (100.4F), Mild Pain (1 - 3)  heparin   Injectable 7000 Unit(s) IV Push every 6 hours PRN For aPTT less than 40  heparin   Injectable 3500 Unit(s) IV Push every 6 hours PRN For aPTT between 40 - 57      Allergies    No Known Allergies    Intolerances        I&O's Summary    2021 07:01  -  2021 07:00  --------------------------------------------------------  IN: 0 mL / OUT: 200 mL / NET: -200 mL          REVIEW OF SYSTEMS:    CONSTITUTIONAL:  As per HPI.  CONSTITUTIONAL: No weakness, fevers or chills  EYES/ENT: No visual changes;  No vertigo or throat pain   NECK: No pain or stiffness  CARDIOVASCULAR: No chest pain or palpitations  GASTROINTESTINAL: No abdominal or epigastric pain. No nausea, vomiting, or hematemesis; No diarrhea or constipation. No melena or hematochezia.  GENITOURINARY: No dysuria, frequency or hematuria  NEUROLOGICAL: No numbness or weakness  SKIN: No itching, burning, rashes, or lesions   All other review of systems is negative unless indicated above      Vital Signs Last 24 Hrs  T(C): 36.2 (2021 01:25), Max: 37.2 (2021 15:24)  T(F): 97.1 (2021 01:25), Max: 98.9 (2021 15:24)  HR: 74 (:) (63 - 95)  BP: 155/71 (:25) (133/51 - 158/62)  BP(mean): 87 (2021 20:37) (87 - 87)  RR: 19 (:) (19 - 21)  SpO2: 99% (:) (78% - 99%)  Daily     Daily   I&O's Summary    2021 07:01  -  2021 07:00  --------------------------------------------------------  IN: 0 mL / OUT: 200 mL / NET: -200 mL        PHYSICAL EXAM:    General:  Alert, well-developed ,No acute distress.    Neuro:  Alert and oriented to person, place, and time. Able to communicate  well. Cranial nerves 2-12 grossly intact. 5/5 strength in all  extremities bilaterally. Sensation intact in all extremities.  Appropriate affect.     HEENT:  No JVD, no masses, Eyes anicteric, No carotid bruits.No lymphadenopathy,    Cardiovascular:  Regular rate and rhythm, with normal S1 and S2. No murmurs, rubs,  or gallops. No JVD.     Lungs:  clear. no rales, no wheezing, .    Abdomen:  Normoactive bowel sounds. Soft, flat, non-tender, and non-distended.  No hepatosplenomegaly, positive bowel sounds    Skin:  Warm, dry, well-perfused. No rashes or other lesions.     Extremities:  2+ pulses in upper and lower extremities. No lower extremity pain or  edema; legs are symmetric in appearance.    LABS:                        8.1    6.46  )-----------( 200      ( 2021 07:41 )             25.8     02-09    145  |  118<H>  |  76<H>  ----------------------------<  179<H>  4.7   |  19<L>  |  1.93<H>    Ca    8.4<L>      2021 07:41  Phos  3.4       Mg     2.0         TPro  5.8<L>  /  Alb  2.0<L>  /  TBili  0.3  /  DBili  x   /  AST  51<H>  /  ALT  32  /  AlkPhos  73      PT/INR - ( 2021 16:43 )   PT: 12.0 sec;   INR: 1.04 ratio         PTT - ( 2021 10:21 )  PTT:> 200 sec  Urinalysis Basic - ( 2021 05:46 )    Color: Yellow / Appearance: Clear / S.020 / pH: x  Gluc: x / Ketone: Negative  / Bili: Negative / Urobili: Negative mg/dL   Blood: x / Protein: 15 mg/dL / Nitrite: Negative   Leuk Esterase: Negative / RBC: Negative /HPF / WBC Negative   Sq Epi: x / Non Sq Epi: Moderate / Bacteria: Many      Magnesium, Serum: 2.0 mg/dL ( @ 07:41)  Phosphorus Level, Serum: 3.4 mg/dL ( @ 07:41)    Duplex LE : Left gastrocnemius Vein thrombosis

## 2021-02-09 NOTE — CONSULT NOTE ADULT - ASSESSMENT
84 y/o male with h/o DM type 2, HTN, HLD was admitted on 2/8 for increased weakness and cough. Pt states he has been having a cough and low grade fevers x 1-2 weeks. Feels mild SOB. Cough is non productive. States fever at home was 101F. He had a decreased appetite. Has loss of taste and smell. Pt states overall feels very weak and tired. In the ED patient was a documented SpO2 of 78% on RA.     1. Acute respiratory failure. COVID-19 viral syndrome. Multifocal pneumonia. ARF. Left leg DVT.  -obtain BC x 2  -respiratory frail  -dose not qualify for remdesivir due to acute renal failure  -O2 therapy  -AC  -respiratory care  -inflammatory markers are elevated  -droplet isolation  -old chart reviewed to assess prior cultures  -monitor temps  -f/u CBC  -supportive care  2. Other issues:   -care per medicine

## 2021-02-09 NOTE — CONSULT NOTE ADULT - ASSESSMENT
86 yo male with PMH of DM2, HTN, HLD presents to ED with complaint of weakness and cough. Pt states he has been having a cough and low grade fevers since January. Feels mild SOB. Cough is non productive. States fever at home was 101. He had a decreased appetite but states has improved in the last 2 days. Has loss of taste and smell. Wife at home positive with COVID. Son also recently had COVID. Pt states he was drinking water at home but maybe not enough. No trouble urinating. No dark urine. Denies any abd pain, N/V/D. No melena or hematochezia. Pt states overall feels very weak and tired.   In the ED patient with no fever. There was a documented SpO2 of 78% on RA. On my exam patient with no O2 on and O2 sat 93-94% at rest. He was also noted to be anemic with hgb of 9.5 (guaiac negative) and OPAL with BUN/Cr of 91/2.6. He was given 1L of NS. CXR shows infiltrates. CT chest shows ground glass opacification. ER spoke with PCP in [facility] Dr. Bandar Larry ([phone]) and stated patient with no history of CKD or anemia in the past.  (08 Feb 2021 19:09)  Above from chart:  Pt asymptomatic, no sob, no cough at this time  excellent UO,VVS  I spoke to Dr Larry as well, he will call back with details , he did not have access to his chart  Most likely pt w COVID PNA, OPAL, possible baseline CKD?  Anemia exacerbated by IVF and dilutional  will follow

## 2021-02-09 NOTE — PROVIDER CONTACT NOTE (CRITICAL VALUE NOTIFICATION) - BACKGROUND
Pt admit +COVID with + left gastrocnemius vein thrombosis. Pt ws on heparin drip, but d/delaney earlier today.

## 2021-02-09 NOTE — PROVIDER CONTACT NOTE (CRITICAL VALUE NOTIFICATION) - SITUATION
Lab called to notify of results, stated test needed to be re-run, critical called at 1317, not at 1020 draw time
Pt noted to have critical aPTT 157.1.

## 2021-02-10 LAB
ANION GAP SERPL CALC-SCNC: 7 MMOL/L — SIGNIFICANT CHANGE UP (ref 5–17)
BASOPHILS # BLD AUTO: 0.01 K/UL — SIGNIFICANT CHANGE UP (ref 0–0.2)
BASOPHILS NFR BLD AUTO: 0.1 % — SIGNIFICANT CHANGE UP (ref 0–2)
BUN SERPL-MCNC: 68 MG/DL — HIGH (ref 7–23)
CALCIUM SERPL-MCNC: 9 MG/DL — SIGNIFICANT CHANGE UP (ref 8.5–10.1)
CHLORIDE SERPL-SCNC: 120 MMOL/L — HIGH (ref 96–108)
CO2 SERPL-SCNC: 19 MMOL/L — LOW (ref 22–31)
CREAT SERPL-MCNC: 1.65 MG/DL — HIGH (ref 0.5–1.3)
EOSINOPHIL # BLD AUTO: 0.01 K/UL — SIGNIFICANT CHANGE UP (ref 0–0.5)
EOSINOPHIL NFR BLD AUTO: 0.1 % — SIGNIFICANT CHANGE UP (ref 0–6)
GLUCOSE SERPL-MCNC: 192 MG/DL — HIGH (ref 70–99)
HCT VFR BLD CALC: 27.8 % — LOW (ref 39–50)
HGB BLD-MCNC: 8.6 G/DL — LOW (ref 13–17)
IMM GRANULOCYTES NFR BLD AUTO: 6.4 % — HIGH (ref 0–1.5)
LYMPHOCYTES # BLD AUTO: 0.84 K/UL — LOW (ref 1–3.3)
LYMPHOCYTES # BLD AUTO: 8.2 % — LOW (ref 13–44)
MAGNESIUM SERPL-MCNC: 1.9 MG/DL — SIGNIFICANT CHANGE UP (ref 1.6–2.6)
MCHC RBC-ENTMCNC: 27.7 PG — SIGNIFICANT CHANGE UP (ref 27–34)
MCHC RBC-ENTMCNC: 30.9 GM/DL — LOW (ref 32–36)
MCV RBC AUTO: 89.7 FL — SIGNIFICANT CHANGE UP (ref 80–100)
MONOCYTES # BLD AUTO: 0.64 K/UL — SIGNIFICANT CHANGE UP (ref 0–0.9)
MONOCYTES NFR BLD AUTO: 6.2 % — SIGNIFICANT CHANGE UP (ref 2–14)
NEUTROPHILS # BLD AUTO: 8.13 K/UL — HIGH (ref 1.8–7.4)
NEUTROPHILS NFR BLD AUTO: 79 % — HIGH (ref 43–77)
PHOSPHATE SERPL-MCNC: 2.7 MG/DL — SIGNIFICANT CHANGE UP (ref 2.5–4.5)
PLATELET # BLD AUTO: 253 K/UL — SIGNIFICANT CHANGE UP (ref 150–400)
POTASSIUM SERPL-MCNC: 4.7 MMOL/L — SIGNIFICANT CHANGE UP (ref 3.5–5.3)
POTASSIUM SERPL-SCNC: 4.7 MMOL/L — SIGNIFICANT CHANGE UP (ref 3.5–5.3)
RBC # BLD: 3.1 M/UL — LOW (ref 4.2–5.8)
RBC # FLD: 13.9 % — SIGNIFICANT CHANGE UP (ref 10.3–14.5)
SODIUM SERPL-SCNC: 146 MMOL/L — HIGH (ref 135–145)
WBC # BLD: 10.29 K/UL — SIGNIFICANT CHANGE UP (ref 3.8–10.5)
WBC # FLD AUTO: 10.29 K/UL — SIGNIFICANT CHANGE UP (ref 3.8–10.5)

## 2021-02-10 PROCEDURE — 99232 SBSQ HOSP IP/OBS MODERATE 35: CPT | Mod: CS

## 2021-02-10 RX ORDER — INSULIN LISPRO 100/ML
VIAL (ML) SUBCUTANEOUS
Refills: 0 | Status: DISCONTINUED | OUTPATIENT
Start: 2021-02-10 | End: 2021-02-12

## 2021-02-10 RX ORDER — INSULIN GLARGINE 100 [IU]/ML
7 INJECTION, SOLUTION SUBCUTANEOUS ONCE
Refills: 0 | Status: COMPLETED | OUTPATIENT
Start: 2021-02-10 | End: 2021-02-10

## 2021-02-10 RX ORDER — SODIUM CHLORIDE 9 MG/ML
1000 INJECTION, SOLUTION INTRAVENOUS
Refills: 0 | Status: DISCONTINUED | OUTPATIENT
Start: 2021-02-10 | End: 2021-02-12

## 2021-02-10 RX ORDER — SODIUM CHLORIDE 9 MG/ML
1000 INJECTION, SOLUTION INTRAVENOUS
Refills: 0 | Status: DISCONTINUED | OUTPATIENT
Start: 2021-02-10 | End: 2021-02-10

## 2021-02-10 RX ORDER — INSULIN LISPRO 100/ML
VIAL (ML) SUBCUTANEOUS AT BEDTIME
Refills: 0 | Status: DISCONTINUED | OUTPATIENT
Start: 2021-02-10 | End: 2021-02-12

## 2021-02-10 RX ORDER — INSULIN GLARGINE 100 [IU]/ML
7 INJECTION, SOLUTION SUBCUTANEOUS EVERY MORNING
Refills: 0 | Status: DISCONTINUED | OUTPATIENT
Start: 2021-02-10 | End: 2021-02-12

## 2021-02-10 RX ADMIN — CARVEDILOL PHOSPHATE 6.25 MILLIGRAM(S): 80 CAPSULE, EXTENDED RELEASE ORAL at 10:49

## 2021-02-10 RX ADMIN — Medication 4: at 12:52

## 2021-02-10 RX ADMIN — APIXABAN 10 MILLIGRAM(S): 2.5 TABLET, FILM COATED ORAL at 20:40

## 2021-02-10 RX ADMIN — Medication 2: at 21:43

## 2021-02-10 RX ADMIN — Medication 2: at 09:48

## 2021-02-10 RX ADMIN — Medication 6 MILLIGRAM(S): at 10:49

## 2021-02-10 RX ADMIN — Medication 1 DROP(S): at 10:49

## 2021-02-10 RX ADMIN — INSULIN GLARGINE 7 UNIT(S): 100 INJECTION, SOLUTION SUBCUTANEOUS at 12:53

## 2021-02-10 RX ADMIN — SODIUM CHLORIDE 70 MILLILITER(S): 9 INJECTION, SOLUTION INTRAVENOUS at 13:30

## 2021-02-10 RX ADMIN — SODIUM CHLORIDE 60 MILLILITER(S): 9 INJECTION, SOLUTION INTRAVENOUS at 17:04

## 2021-02-10 RX ADMIN — CARVEDILOL PHOSPHATE 6.25 MILLIGRAM(S): 80 CAPSULE, EXTENDED RELEASE ORAL at 20:39

## 2021-02-10 RX ADMIN — LATANOPROST 1 DROP(S): 0.05 SOLUTION/ DROPS OPHTHALMIC; TOPICAL at 22:18

## 2021-02-10 RX ADMIN — APIXABAN 10 MILLIGRAM(S): 2.5 TABLET, FILM COATED ORAL at 10:48

## 2021-02-10 RX ADMIN — Medication 4: at 17:15

## 2021-02-10 RX ADMIN — Medication 81 MILLIGRAM(S): at 10:48

## 2021-02-10 RX ADMIN — ATORVASTATIN CALCIUM 20 MILLIGRAM(S): 80 TABLET, FILM COATED ORAL at 20:39

## 2021-02-10 RX ADMIN — SODIUM CHLORIDE 75 MILLILITER(S): 9 INJECTION INTRAMUSCULAR; INTRAVENOUS; SUBCUTANEOUS at 04:26

## 2021-02-10 NOTE — PROGRESS NOTE ADULT - ASSESSMENT
ASSESSMENT AND PLAN:    84 yo male with PMH of DM2, HTN, HLD admitted with:    #COVID 19 PNA  #Acute hypoxic respiratory failure (SpO2 78% on RA documented on arrival to ED)  -O2 via NC PRN to maintain SPO2 88-94   -PO decadron  - no remdesevir for now given CrCl <30  - ID consult APPRECIATED.     #Type B lactic acidosis  -likely related to metformin/opal  -resolved.    #LLE DVT:  -dc vq scan, will not change mx.   -dc heparin gtt and change to eliquis.     #OPAL  improved , now close to baseline  cr 1.65  # hypernatremia, hyperchloremia from IVF  patient's cr baseline for past 5 years has be 1.5 as per Dr HASSAN his PCP [phone]  -continue to hold losartan  change to 1/2 NS at 60 for 7 hrs more   - nephro consult appreciated, will follow  -no obstruction on imaging.   -monitor creatinine.    #normocytic anemia , exacerbated by dilutional effect  has thalasemia minor  baseline hct 35 as per PMD  - monitor H/H  - stool occult negative  -b12/folate/iron stores adequate  -could be related to renal disease     #DM2  - hold metformin  - hold actos  - continue ss    #HTN  - losartan held secondary to OPAL  - continue coreg    #Advance Directives  - FULL CODE    #dispo:  -f/u bmp in am.   -dc planning once BMP stable,   
86 y/o male with h/o DM type 2, HTN, HLD was admitted on 2/8 for increased weakness and cough. Pt states he has been having a cough and low grade fevers x 1-2 weeks. Feels mild SOB. Cough is non productive. States fever at home was 101F. He had a decreased appetite. Has loss of taste and smell. Pt states overall feels very weak and tired. In the ED patient was a documented SpO2 of 78% on RA.     1. COVID-19 viral syndrome. Multifocal pneumonia. ARF. Left leg DVT.  -respiratory improved  -BC x 2 are negative to date  -on RA  -dose not qualify for remdesivir due to acute renal failure  -O2 therapy  -AC  -respiratory care  -inflammatory markers are elevated  -droplet isolation  -observe off antiviral therapy  -monitor temps  -f/u CBC  -supportive care  2. Other issues:   -care per medicine      
84 yo male with PMH of DM2, HTN, HLD presents to ED with complaint of weakness and cough. Pt states he has been having a cough and low grade fevers since January. Feels mild SOB. Cough is non productive. States fever at home was 101. He had a decreased appetite but states has improved in the last 2 days. Has loss of taste and smell. Wife at home positive with COVID. Son also recently had COVID. Pt states he was drinking water at home but maybe not enough. No trouble urinating. No dark urine. Denies any abd pain, N/V/D. No melena or hematochezia. Pt states overall feels very weak and tired.   In the ED patient with no fever. There was a documented SpO2 of 78% on RA. On my exam patient with no O2 on and O2 sat 93-94% at rest. He was also noted to be anemic with hgb of 9.5 (guaiac negative) and OPAL with BUN/Cr of 91/2.6. He was given 1L of NS. CXR shows infiltrates. CT chest shows ground glass opacification. ER spoke with PCP in [facility] Dr. Bandar Larry ([phone]) and stated patient with no history of CKD or anemia in the past.  (08 Feb 2021 19:09)  Above from chart:  Pt asymptomatic, no sob, no cough at this time  excellent UO,VVS  I spoke to Dr Larry as well, he will call back with details , he did not have access to his chart  Most likely pt w COVID PNA, OPAL, possible baseline CKD?  Anemia exacerbated by IVF and dilutional  will follow    2/10  pt w CKD3 baseline creat 1.5   Creat improving  From renal standpoint may be dcd   follow up w Dr Larry

## 2021-02-11 ENCOUNTER — TRANSCRIPTION ENCOUNTER (OUTPATIENT)
Age: 86
End: 2021-02-11

## 2021-02-11 LAB
ALBUMIN SERPL ELPH-MCNC: 2.2 G/DL — LOW (ref 3.3–5)
ALP SERPL-CCNC: 68 U/L — SIGNIFICANT CHANGE UP (ref 40–120)
ALT FLD-CCNC: 35 U/L — SIGNIFICANT CHANGE UP (ref 12–78)
ANION GAP SERPL CALC-SCNC: 6 MMOL/L — SIGNIFICANT CHANGE UP (ref 5–17)
AST SERPL-CCNC: 46 U/L — HIGH (ref 15–37)
BASOPHILS # BLD AUTO: 0.03 K/UL — SIGNIFICANT CHANGE UP (ref 0–0.2)
BASOPHILS NFR BLD AUTO: 0.3 % — SIGNIFICANT CHANGE UP (ref 0–2)
BILIRUB SERPL-MCNC: 0.3 MG/DL — SIGNIFICANT CHANGE UP (ref 0.2–1.2)
BUN SERPL-MCNC: 65 MG/DL — HIGH (ref 7–23)
CALCIUM SERPL-MCNC: 9.2 MG/DL — SIGNIFICANT CHANGE UP (ref 8.5–10.1)
CHLORIDE SERPL-SCNC: 116 MMOL/L — HIGH (ref 96–108)
CO2 SERPL-SCNC: 21 MMOL/L — LOW (ref 22–31)
CREAT SERPL-MCNC: 1.53 MG/DL — HIGH (ref 0.5–1.3)
EOSINOPHIL # BLD AUTO: 0 K/UL — SIGNIFICANT CHANGE UP (ref 0–0.5)
EOSINOPHIL NFR BLD AUTO: 0 % — SIGNIFICANT CHANGE UP (ref 0–6)
GLUCOSE SERPL-MCNC: 164 MG/DL — HIGH (ref 70–99)
HCT VFR BLD CALC: 27.6 % — LOW (ref 39–50)
HGB BLD-MCNC: 8.7 G/DL — LOW (ref 13–17)
IMM GRANULOCYTES NFR BLD AUTO: 6.9 % — HIGH (ref 0–1.5)
LYMPHOCYTES # BLD AUTO: 0.86 K/UL — LOW (ref 1–3.3)
LYMPHOCYTES # BLD AUTO: 8 % — LOW (ref 13–44)
MCHC RBC-ENTMCNC: 28.4 PG — SIGNIFICANT CHANGE UP (ref 27–34)
MCHC RBC-ENTMCNC: 31.5 GM/DL — LOW (ref 32–36)
MCV RBC AUTO: 90.2 FL — SIGNIFICANT CHANGE UP (ref 80–100)
MONOCYTES # BLD AUTO: 0.72 K/UL — SIGNIFICANT CHANGE UP (ref 0–0.9)
MONOCYTES NFR BLD AUTO: 6.7 % — SIGNIFICANT CHANGE UP (ref 2–14)
NEUTROPHILS # BLD AUTO: 8.38 K/UL — HIGH (ref 1.8–7.4)
NEUTROPHILS NFR BLD AUTO: 78.1 % — HIGH (ref 43–77)
PLATELET # BLD AUTO: 274 K/UL — SIGNIFICANT CHANGE UP (ref 150–400)
POTASSIUM SERPL-MCNC: 4.6 MMOL/L — SIGNIFICANT CHANGE UP (ref 3.5–5.3)
POTASSIUM SERPL-SCNC: 4.6 MMOL/L — SIGNIFICANT CHANGE UP (ref 3.5–5.3)
PROT SERPL-MCNC: 5.9 GM/DL — LOW (ref 6–8.3)
RBC # BLD: 3.06 M/UL — LOW (ref 4.2–5.8)
RBC # FLD: 13.8 % — SIGNIFICANT CHANGE UP (ref 10.3–14.5)
SODIUM SERPL-SCNC: 143 MMOL/L — SIGNIFICANT CHANGE UP (ref 135–145)
WBC # BLD: 10.73 K/UL — HIGH (ref 3.8–10.5)
WBC # FLD AUTO: 10.73 K/UL — HIGH (ref 3.8–10.5)

## 2021-02-11 PROCEDURE — 99239 HOSP IP/OBS DSCHRG MGMT >30: CPT | Mod: CS

## 2021-02-11 RX ORDER — APIXABAN 2.5 MG/1
2 TABLET, FILM COATED ORAL
Qty: 10 | Refills: 0
Start: 2021-02-11 | End: 2021-02-15

## 2021-02-11 RX ORDER — APIXABAN 2.5 MG/1
1 TABLET, FILM COATED ORAL
Qty: 60 | Refills: 0
Start: 2021-02-11 | End: 2021-03-12

## 2021-02-11 RX ORDER — METFORMIN HYDROCHLORIDE 850 MG/1
1 TABLET ORAL
Qty: 0 | Refills: 0 | DISCHARGE

## 2021-02-11 RX ORDER — LOSARTAN POTASSIUM 100 MG/1
1 TABLET, FILM COATED ORAL
Qty: 0 | Refills: 0 | DISCHARGE

## 2021-02-11 RX ORDER — ALBUTEROL 90 UG/1
1 AEROSOL, METERED ORAL
Qty: 9 | Refills: 0
Start: 2021-02-11 | End: 2021-03-12

## 2021-02-11 RX ORDER — METFORMIN HYDROCHLORIDE 850 MG/1
1 TABLET ORAL
Qty: 60 | Refills: 0
Start: 2021-02-11 | End: 2021-03-12

## 2021-02-11 RX ORDER — APIXABAN 2.5 MG/1
2 TABLET, FILM COATED ORAL
Qty: 70 | Refills: 0
Start: 2021-02-11 | End: 2021-03-12

## 2021-02-11 RX ADMIN — Medication 1 DROP(S): at 09:48

## 2021-02-11 RX ADMIN — APIXABAN 10 MILLIGRAM(S): 2.5 TABLET, FILM COATED ORAL at 21:40

## 2021-02-11 RX ADMIN — CARVEDILOL PHOSPHATE 6.25 MILLIGRAM(S): 80 CAPSULE, EXTENDED RELEASE ORAL at 21:40

## 2021-02-11 RX ADMIN — LATANOPROST 1 DROP(S): 0.05 SOLUTION/ DROPS OPHTHALMIC; TOPICAL at 21:46

## 2021-02-11 RX ADMIN — CARVEDILOL PHOSPHATE 6.25 MILLIGRAM(S): 80 CAPSULE, EXTENDED RELEASE ORAL at 09:48

## 2021-02-11 RX ADMIN — APIXABAN 10 MILLIGRAM(S): 2.5 TABLET, FILM COATED ORAL at 09:48

## 2021-02-11 RX ADMIN — Medication 2: at 08:17

## 2021-02-11 RX ADMIN — ATORVASTATIN CALCIUM 20 MILLIGRAM(S): 80 TABLET, FILM COATED ORAL at 21:40

## 2021-02-11 RX ADMIN — Medication 2: at 17:19

## 2021-02-11 RX ADMIN — INSULIN GLARGINE 7 UNIT(S): 100 INJECTION, SOLUTION SUBCUTANEOUS at 08:16

## 2021-02-11 RX ADMIN — Medication 81 MILLIGRAM(S): at 09:48

## 2021-02-11 RX ADMIN — Medication 4: at 12:17

## 2021-02-11 NOTE — DISCHARGE NOTE PROVIDER - HOSPITAL COURSE
86 yo male with PMH of DM2, HTN, HLD presents to ED with complaint of weakness and cough. Pt states he has been having a cough and low grade fevers since January. Feels mild SOB. Cough is non productive. States fever at home was 101. He had a decreased appetite but states has improved in the last 2 days. Has loss of taste and smell. Wife at home positive with COVID. Son also recently had COVID. Pt states he was drinking water at home but maybe not enough. No trouble urinating. No dark urine. Denies any abd pain, N/V/D. No melena or hematochezia. Pt states overall feels very weak and tired.     In the ED patient with no fever. There was a documented SpO2 of 78% on RA. subsequently,  with no O2 on and O2 sat 93-94% at rest. He was also noted to be anemic with hgb of 9.5 (guaiac negative) and OPAL with BUN/Cr of 91/2.6. He was given 1L of NS. CXR shows infiltrates. CT chest shows ground glass opacification. ER spoke with PCP in [facility] Dr. Bandar Larry ([phone]) and stated patient with no history of CKD or anemia in the past.      2/9: pt seen and examined this am. Felt ok. No sob/chest pain. Still with cough. No n/v/d. Hungry , wants to eat. HAsn't eaten since admission as he was npo for vq scan  2/10 feels better today, wants to work with PT  2/11 worked with PT , feels better wants to go home, no SOB   PHYSICAL EXAM:    GENERAL: Comfortable, no acute distress  HEAD:  Atraumatic, Normocephalic  EYES: EOMI, PERRLA  HEENT: very dry mucous membranes  NECK: Supple, No JVD  NERVOUS SYSTEM:  Alert & Oriented X3, Motor Strength 5/5 B/L upper and lower extremities  CHEST/LUNG: decreased bs b/l  HEART: Regular rate and rhythm; No murmurs, rubs, or gallops  ABDOMEN: Soft, Nontender, Nondistended; Bowel sounds present  GENITOURINARY- Voiding, no palpable bladder  EXTREMITIES:  No clubbing, cyanosis, or edema  MUSCULOSKELETAL- No muscle tenderness, No joint tenderness  SKIN-no rash      86 yo male with PMH of DM2, HTN, HLD admitted with:    #COVID 19 PNA  #Acute hypoxic respiratory failure (SpO2 78% on RA documented on arrival to ED)  -O2 via NC PRN to maintain SPO2 88-94   -PO decadron  - no remdesevir for now given CrCl <30  - ID consult APPRECIATED.     #Type B lactic acidosis  -likely related to metformin/opal  -resolved.    #LLE DVT:  -dc vq scan, will not change mx.   -dc heparin gtt and change to eliquis.       #OPAL  improved , now close to baseline  cr 1.65  # hypernatremia, hyperchloremia from IVFpatient's cr baseline for past 5 years has be 1.5 as per Dr HASSAN his PCP [phone]  -continue to hold losartan  change to 1/2 NS at 60 for 7 hrs more   - nephro consult appreciated, will follow  -no obstruction on imaging.   -monitor creatinine.    #normocytic anemia , exacerbated by dilutional effect  has thalasemia minor  baseline hct 35 as per PMD  - monitor H/H  - stool occult negative  -b12/folate/iron stores adequate  -could be related to renal disease     #DM2  - hold metformin  - hold actos  - continue ss    #HTN  - losartan held secondary to OPAL  - continue coreg    #Advance Directives  - FULL CODE    #dispo:  -f/u bmp in am.   -dc planning once BMP stable,    84 yo male with PMH of DM2, HTN, HLD presents to ED with complaint of weakness and cough. Pt states he has been having a cough and low grade fevers since January. Feels mild SOB. Cough is non productive. States fever at home was 101. He had a decreased appetite but states has improved in the last 2 days. Has loss of taste and smell. Wife at home positive with COVID. Son also recently had COVID. Pt states he was drinking water at home but maybe not enough. No trouble urinating. No dark urine. Denies any abd pain, N/V/D. No melena or hematochezia. Pt states overall feels very weak and tired.     In the ED patient with no fever. There was a documented SpO2 of 78% on RA. subsequently,  with no O2 on and O2 sat 93-94% at rest. He was also noted to be anemic with hgb of 9.5 (guaiac negative) and OPAL with BUN/Cr of 91/2.6. He was given 1L of NS. CXR shows infiltrates. CT chest shows ground glass opacification. ER spoke with PCP in [facility] Dr. Bandar Larry ([phone]) and stated patient with no history of CKD or anemia in the past.      2/9: pt seen and examined this am. Felt ok. No sob/chest pain. Still with cough. No n/v/d. Hungry , wants to eat. HAsn't eaten since admission as he was npo for vq scan  2/10 feels better today, wants to work with PT  2/11 worked with PT , feels better wants to go home, no SOB   PHYSICAL EXAM:    GENERAL: Comfortable, no acute distress  HEAD:  Atraumatic, Normocephalic  EYES: EOMI, PERRLA  HEENT: very dry mucous membranes  NECK: Supple, No JVD  NERVOUS SYSTEM:  Alert & Oriented X3, Motor Strength 5/5 B/L upper and lower extremities  CHEST/LUNG: decreased bs b/l  HEART: Regular rate and rhythm; No murmurs, rubs, or gallops  ABDOMEN: Soft, Nontender, Nondistended; Bowel sounds present  GENITOURINARY- Voiding, no palpable bladder  EXTREMITIES:  No clubbing, cyanosis, or edema  MUSCULOSKELETAL- No muscle tenderness, No joint tenderness  SKIN-no rash      84 yo male with PMH of DM2, HTN, HLD admitted with:    #COVID 19 PNA  #Acute hypoxic respiratory failure (SpO2 78% on RA documented on arrival to ED)  weaned off O2, now on RA pulse ox 97%  did not qualify for home o2  -s/p PO decadron  - no remdesevir  due to renal insufficiency  - ID consult APPRECIATED.     #Type B lactic acidosis  -likely related to metformin/opal  -resolved.  stop metformin   start lantus    #LLE DVT:  -dc vq scan, will not change mx.   -dc heparin gtt and change to eliquis.       #OPAL  improved , CKD  now close to baseline  cr 1.5 close to baseline    I muna HASSAN his PCP [phone]   hold losartan  off IVF  - nephro consult appreciated,   -no obstruction on imaging.       #normocytic anemia , exacerbated by dilutional effect  has thalasemia minor  baseline hct 35 as per PMD, hct 35  - monitor H/H as outpatient f/u with PCP  no evidence of gross blood in stool  -b12/folate/iron stores adequate  -could be related to renal disease     #DM2  - hold metformin  - hold actos  - continue ss    #HTN  - losartan held secondary to OPAL  - continue coreg    #Advance Directives  - FULL CODE     medically stable for discharge   84 yo male with PMH of DM2, HTN, HLD presents to ED with complaint of weakness and cough. Pt states he has been having a cough and low grade fevers since January. Feels mild SOB. Cough is non productive. States fever at home was 101. He had a decreased appetite but states has improved in the last 2 days. Has loss of taste and smell. Wife at home positive with COVID. Son also recently had COVID. Pt states he was drinking water at home but maybe not enough. No trouble urinating. No dark urine. Denies any abd pain, N/V/D. No melena or hematochezia. Pt states overall feels very weak and tired.     In the ED patient with no fever. There was a documented SpO2 of 78% on RA. subsequently,  with no O2 on and O2 sat 93-94% at rest. He was also noted to be anemic with hgb of 9.5 (guaiac negative) and OPAL with BUN/Cr of 91/2.6. He was given 1L of NS. CXR shows infiltrates. CT chest shows ground glass opacification. ER spoke with PCP in [facility] Dr. Bandar Larry ([phone]) and stated patient with no history of CKD or anemia in the past.      2/9: pt seen and examined this am. Felt ok. No sob/chest pain. Still with cough. No n/v/d. Hungry , wants to eat. HAsn't eaten since admission as he was npo for vq scan  2/10 feels better today, wants to work with PT  2/11 worked with PT , feels better wants to go home, no SOB   PHYSICAL EXAM:    GENERAL: Comfortable, no acute distress  HEAD:  Atraumatic, Normocephalic  EYES: EOMI, PERRLA  HEENT: very dry mucous membranes  NECK: Supple, No JVD  NERVOUS SYSTEM:  Alert & Oriented X3, Motor Strength 5/5 B/L upper and lower extremities  CHEST/LUNG: decreased bs b/l  HEART: Regular rate and rhythm; No murmurs, rubs, or gallops  ABDOMEN: Soft, Nontender, Nondistended; Bowel sounds present  GENITOURINARY- Voiding, no palpable bladder  EXTREMITIES:  No clubbing, cyanosis, or edema  MUSCULOSKELETAL- No muscle tenderness, No joint tenderness  SKIN-no rash      84 yo male with PMH of DM2, HTN, HLD admitted with:    #COVID 19 PNA  #Acute hypoxic respiratory failure (SpO2 78% on RA documented on arrival to ED)  weaned off O2, now on RA pulse ox 97%  did not qualify for home o2  -s/p PO decadron  - no remdesevir  due to renal insufficiency  - ID consult APPRECIATED.     #Type B lactic acidosis  -likely related to metformin/opal  -resolved.  stop metformin   A1c 6.2, continuie actos, f/u with PCP   gluc elevated inpatient due to decadron, now off decadron     #LLE DVT:  -dc vq scan, will not change mx.   -dc heparin gtt and change to eliquis.       #OPAL  improved , CKD  now close to baseline  cr 1.5 close to baseline    I muna HASSAN his PCP [phone]   hold losartan  off IVF  - nephro consult appreciated,   -no obstruction on imaging.       #normocytic anemia , exacerbated by dilutional effect  has thalasemia minor  baseline hct 35 as per PMD, hct 35  - monitor H/H as outpatient f/u with PCP  no evidence of gross blood in stool  -b12/folate/iron stores adequate  -could be related to renal disease     #DM2  - hold metformin  - hold actos  - continue ss    #HTN  - losartan held secondary to OPAL  - continue coreg    #Advance Directives  - FULL CODE     medically stable for discharge   84 yo male with PMH of DM2, HTN, HLD presents to ED with complaint of weakness and cough. Pt states he has been having a cough and low grade fevers since January. Feels mild SOB. Cough is non productive. States fever at home was 101. He had a decreased appetite but states has improved in the last 2 days. Has loss of taste and smell. Wife at home positive with COVID. Son also recently had COVID. Pt states he was drinking water at home but maybe not enough. No trouble urinating. No dark urine. Denies any abd pain, N/V/D. No melena or hematochezia. Pt states overall feels very weak and tired.     In the ED patient with no fever. There was a documented SpO2 of 78% on RA. subsequently,  with no O2 on and O2 sat 93-94% at rest. He was also noted to be anemic with hgb of 9.5 (guaiac negative) and OPAL with BUN/Cr of 91/2.6. He was given 1L of NS. CXR shows infiltrates. CT chest shows ground glass opacification. ER spoke with PCP in [facility] Dr. Bandar Larry ([phone]) and stated patient with no history of CKD or anemia in the past.      2/9: pt seen and examined this am. Felt ok. No sob/chest pain. Still with cough. No n/v/d. Hungry , wants to eat. HAsn't eaten since admission as he was npo for vq scan  2/10 feels better today, wants to work with PT  2/11 worked with PT , feels better wants to go home, no SOB   PHYSICAL EXAM:    GENERAL: Comfortable, no acute distress  HEAD:  Atraumatic, Normocephalic  EYES: EOMI, PERRLA  HEENT: very dry mucous membranes  NECK: Supple, No JVD  NERVOUS SYSTEM:  Alert & Oriented X3, Motor Strength 5/5 B/L upper and lower extremities  CHEST/LUNG: decreased bs b/l  HEART: Regular rate and rhythm; No murmurs, rubs, or gallops  ABDOMEN: Soft, Nontender, Nondistended; Bowel sounds present  GENITOURINARY- Voiding, no palpable bladder  EXTREMITIES:  No clubbing, cyanosis, or edema  MUSCULOSKELETAL- No muscle tenderness, No joint tenderness  SKIN-no rash      84 yo male with PMH of DM2, HTN, HLD admitted with:    #COVID 19 PNA  #Acute hypoxic respiratory failure (SpO2 78% on RA documented on arrival to ED)  weaned off O2, now on RA pulse ox 97%  did not qualify for home o2  -s/p PO decadron  - no remdesevir  due to renal insufficiency  - ID consult APPRECIATED.     #Type B lactic acidosis  -likely related to metformin/opal  -resolved.  dcreased dose of  metformin - I discussed with PCP who agreed A1c 6.2, continuie actos, f/u with PCP   gluc elevated inpatient due to decadron, now off decadron     #LLE DVT:  -dc vq scan, will not change mx.   -dc heparin gtt and change to eliquis.       #OPAL  improved , CKD  now close to baseline  cr 1.5 close to baseline    I dw Dr SONYA wyatt his PCP [phone]   hold losartan  off IVF  - nephro consult appreciated,   -no obstruction on imaging.       #normocytic anemia , exacerbated by dilutional effect  has thalasemia minor  baseline hct 35 as per PMD, hct 35  - monitor H/H as outpatient f/u with PCP  no evidence of gross blood in stool  -b12/folate/iron stores adequate  -could be related to renal disease   would need further anemia w/u with hematologist    #DM2  decreased dose of metformin and resume actos  at time of discharge   -    #HTN  - losartan held secondary to OPAL  - continue coreg    #Advance Directives  - FULL CODE     medically stable for discharge   86 yo male with PMH of DM2, HTN, HLD presents to ED with complaint of weakness and cough. Pt states he has been having a cough and low grade fevers since January. Feels mild SOB. Cough is non productive. States fever at home was 101. He had a decreased appetite but states has improved in the last 2 days. Has loss of taste and smell. Wife at home positive with COVID. Son also recently had COVID. Pt states he was drinking water at home but maybe not enough. No trouble urinating. No dark urine. Denies any abd pain, N/V/D. No melena or hematochezia. Pt states overall feels very weak and tired.     In the ED patient with no fever. There was a documented SpO2 of 78% on RA. subsequently,  with no O2 on and O2 sat 93-94% at rest. He was also noted to be anemic with hgb of 9.5 (guaiac negative) and OPAL with BUN/Cr of 91/2.6. He was given 1L of NS. CXR shows infiltrates. CT chest shows ground glass opacification. ER spoke with PCP in [facility] Dr. Bandar Larry ([phone]) and stated patient with no history of CKD or anemia in the past.      2/9: pt seen and examined this am. Felt ok. No sob/chest pain. Still with cough. No n/v/d. Hungry , wants to eat. HAsn't eaten since admission as he was npo for vq scan  2/10 feels better today, wants to work with PT  2/11 worked with PT , feels better wants to go home, no SOB   PHYSICAL EXAM:    GENERAL: Comfortable, no acute distress  HEAD:  Atraumatic, Normocephalic  EYES: EOMI, PERRLA  HEENT: very dry mucous membranes  NECK: Supple, No JVD  NERVOUS SYSTEM:  Alert & Oriented X3, Motor Strength 5/5 B/L upper and lower extremities  CHEST/LUNG: decreased bs b/l  HEART: Regular rate and rhythm; No murmurs, rubs, or gallops  ABDOMEN: Soft, Nontender, Nondistended; Bowel sounds present  GENITOURINARY- Voiding, no palpable bladder  EXTREMITIES:  No clubbing, cyanosis, or edema  MUSCULOSKELETAL- No muscle tenderness, No joint tenderness  SKIN-no rash      86 yo male with PMH of DM2, HTN, HLD admitted with:    #COVID 19 PNA  #Acute hypoxic respiratory failure (SpO2 78% on RA documented on arrival to ED)  weaned off O2, now on RA pulse ox 97%  did not qualify for home o2  -s/p PO decadron  - no remdesevir  due to renal insufficiency  - ID consult APPRECIATED.     #Type B lactic acidosis  -likely related to metformin/opal  -resolved.  dcreased dose of  metformin - I discussed with PCP who agreed A1c 6.2, continuie actos, f/u with PCP   gluc elevated inpatient due to decadron, now off decadron     #LLE DVT:  -dc vq scan, will not change mx.   -dc heparin gtt and change to eliquis.       #OPAL  improved , CKD  now close to baseline  cr 1.5 close to baseline    I dw Dr SONYA wyatt his PCP [phone]   hold losartan  off IVF  - nephro consult appreciated,   -no obstruction on imaging.       #normocytic anemia , exacerbated by dilutional effect  has thalasemia minor  baseline hct 35 as per PMD, hct 35  - monitor H/H as outpatient f/u with PCP  no evidence of gross blood in stool  -b12/folate/iron stores adequate  -could be related to renal disease   would need further anemia w/u with hematologist    #DM2  decreased dose of metformin and resume actos  at time of discharge     #HTN  - losartan held secondary to OPAL  - continue coreg    #Advance Directives  - FULL CODE     medically stable for discharge

## 2021-02-11 NOTE — PHYSICAL THERAPY INITIAL EVALUATION ADULT - PERTINENT HX OF CURRENT PROBLEM, REHAB EVAL
Pt admitted to  secondary to fever, cough, mild SOB, and loss of taste and smell. Pt's son and wife both recently + for COVID. COVID 19: +. Antibody +- 14.10. H/H- 8.7/27.6. Bilateral LE dopplers: left gastrocnemius vein thrombosis.

## 2021-02-11 NOTE — DISCHARGE NOTE PROVIDER - NSDCMRMEDTOKEN_GEN_ALL_CORE_FT
Aspirin Enteric Coated 81 mg oral delayed release tablet: 1 tab(s) orally once a day  atorvastatin 20 mg oral tablet: 1 tab(s) orally once a day  azithromycin 250 mg oral tablet: Use as directed  ***Course Completed***  carvedilol 6.25 mg oral tablet: 1 tab(s) orally 2 times a day  losartan 100 mg oral tablet: 1 tab(s) orally once a day  metFORMIN 1000 mg oral tablet: 1 tab(s) orally 2 times a day  pioglitazone 30 mg oral tablet: 1 tab(s) orally once a day  Timolol Maleate (Eqv-Timoptic) 0.5% ophthalmic solution: 1 drop(s) in the right eye once a day  travoprost 0.004% ophthalmic solution: 1 drop(s) to each affected eye once a day (in the evening)   albuterol 90 mcg/inh inhalation aerosol: 1 puff(s) inhaled every 4 hours, As Needed   apixaban 5 mg oral tablet: 2 tab(s) orally every 12 hours for 10 more doses(5 days), then change to 1 tab po q 12hrs  Aspirin Enteric Coated 81 mg oral delayed release tablet: 1 tab(s) orally once a day  atorvastatin 20 mg oral tablet: 1 tab(s) orally once a day  carvedilol 6.25 mg oral tablet: 1 tab(s) orally 2 times a day  pioglitazone 30 mg oral tablet: 1 tab(s) orally once a day  Timolol Maleate (Eqv-Timoptic) 0.5% ophthalmic solution: 1 drop(s) in the right eye once a day  travoprost 0.004% ophthalmic solution: 1 drop(s) to each affected eye once a day (in the evening)   albuterol 90 mcg/inh inhalation aerosol: 1 puff(s) inhaled every 4 hours, As Needed   apixaban 5 mg oral tablet: 2 tab(s) orally every 12 hours for 10 more doses(5 days), then change to 1 tab po q 12hrs  Aspirin Enteric Coated 81 mg oral delayed release tablet: 1 tab(s) orally once a day  atorvastatin 20 mg oral tablet: 1 tab(s) orally once a day  carvedilol 6.25 mg oral tablet: 1 tab(s) orally 2 times a day  metFORMIN 500 mg oral tablet: 1 tab(s) orally 2 times a day  pioglitazone 30 mg oral tablet: 1 tab(s) orally once a day  Timolol Maleate (Eqv-Timoptic) 0.5% ophthalmic solution: 1 drop(s) in the right eye once a day  travoprost 0.004% ophthalmic solution: 1 drop(s) to each affected eye once a day (in the evening)   albuterol 90 mcg/inh inhalation aerosol: 1 puff(s) inhaled every 4 hours, As Needed   Aspirin Enteric Coated 81 mg oral delayed release tablet: 1 tab(s) orally once a day  atorvastatin 20 mg oral tablet: 1 tab(s) orally once a day  carvedilol 6.25 mg oral tablet: 1 tab(s) orally 2 times a day  Eliquis 5 mg oral tablet: 2 tab(s) orally every 12 hours (Total of 10 doses) for 5 days   Eliquis 5 mg oral tablet: 1 tab(s) orally every 12 hours   metFORMIN 500 mg oral tablet: 1 tab(s) orally 2 times a day  pioglitazone 30 mg oral tablet: 1 tab(s) orally once a day  Timolol Maleate (Eqv-Timoptic) 0.5% ophthalmic solution: 1 drop(s) in the right eye once a day  travoprost 0.004% ophthalmic solution: 1 drop(s) to each affected eye once a day (in the evening)   albuterol 90 mcg/inh inhalation aerosol: 1 puff(s) inhaled every 4 hours, As Needed   Aspirin Enteric Coated 81 mg oral delayed release tablet: 1 tab(s) orally once a day  atorvastatin 20 mg oral tablet: 1 tab(s) orally once a day  carvedilol 6.25 mg oral tablet: 1 tab(s) orally 2 times a day  Eliquis 5 mg oral tablet: 1 tab(s) orally every 12 hours   Eliquis 5 mg oral tablet: 2 tab(s) orally every 12 hours (Total of 10 doses) for 5 days   metFORMIN 500 mg oral tablet: 1 tab(s) orally 2 times a day  pioglitazone 30 mg oral tablet: 1 tab(s) orally once a day  Timolol Maleate (Eqv-Timoptic) 0.5% ophthalmic solution: 1 drop(s) in the right eye once a day  travoprost 0.004% ophthalmic solution: 1 drop(s) to each affected eye once a day (in the evening)

## 2021-02-11 NOTE — DISCHARGE NOTE PROVIDER - CARE PROVIDER_API CALL
primary doctor,   Phone: (   )    -  Fax: (   )    -  Follow Up Time:     Avelino Bermudez  NEPHROLOGY  [address] [facility], [address] [place] [address]  Phone: [phone]  Fax: [phone]  Follow Up Time:

## 2021-02-11 NOTE — DISCHARGE NOTE PROVIDER - NSDCCPCAREPLAN_GEN_ALL_CORE_FT
PRINCIPAL DISCHARGE DIAGNOSIS  Diagnosis: COVID-19  Assessment and Plan of Treatment: pleae follow up with your primary doctor in 1 week        SECONDARY DISCHARGE DIAGNOSES  Diagnosis: Anemia  Assessment and Plan of Treatment:     Diagnosis: OPAL (acute kidney injury)  Assessment and Plan of Treatment:      PRINCIPAL DISCHARGE DIAGNOSIS  Diagnosis: COVID-19  Assessment and Plan of Treatment: pleae follow up with your primary doctor , Dr Painting in 1 week        SECONDARY DISCHARGE DIAGNOSES  Diagnosis: Anemia  Assessment and Plan of Treatment:     Diagnosis: OPAL (acute kidney injury)  Assessment and Plan of Treatment:      PRINCIPAL DISCHARGE DIAGNOSIS  Diagnosis: COVID-19  Assessment and Plan of Treatment: pleae follow up with your primary doctor , Dr Painting in 1 week        SECONDARY DISCHARGE DIAGNOSES  Diagnosis: DVT, lower extremity  Assessment and Plan of Treatment: You have been started on eliquis while in the hospital for a blood clot.  Continue taking Eliquis 10 mg twice a day for 8 more doses that will be complete a total of 7 days on February 16th in the morning and begin taking Eliquis 5mg twice daily until follow up with your primary care provider.       Diagnosis: Anemia  Assessment and Plan of Treatment:     Diagnosis: OPAL (acute kidney injury)  Assessment and Plan of Treatment:

## 2021-02-11 NOTE — DISCHARGE NOTE NURSING/CASE MANAGEMENT/SOCIAL WORK - PATIENT PORTAL LINK FT
You can access the FollowMyHealth Patient Portal offered by [facility] by registering at the following website: [email or web address]. By joining [facility]’s FollowMyHealth portal, you will also be able to view your health information using other applications (apps) compatible with our system.

## 2021-02-11 NOTE — PHYSICAL THERAPY INITIAL EVALUATION ADULT - PLANNED THERAPY INTERVENTIONS, PT EVAL
Eval, amb, transfers, bed mobility x 15'./bed mobility training/gait training/strengthening/transfer training

## 2021-02-11 NOTE — PROVIDER CONTACT NOTE (OTHER) - SITUATION
LEFT MESSAGE WITH ROMAINE FROM DR. HOWARD'S ANSWERING SERVICE.
Notified MD of patients 02 sat on ambulation is 93-94% on Room air while ambulating to and from bathroom

## 2021-02-11 NOTE — DISCHARGE NOTE PROVIDER - NSDCHOSPICE_GEN_A_CORE
Attempted to contact pt at  number, no answer. m for pt to return call to office at [phone]. Will continue to try to contact pt. No

## 2021-02-12 VITALS
DIASTOLIC BLOOD PRESSURE: 71 MMHG | TEMPERATURE: 97 F | RESPIRATION RATE: 16 BRPM | OXYGEN SATURATION: 99 % | SYSTOLIC BLOOD PRESSURE: 153 MMHG | HEART RATE: 74 BPM

## 2021-02-12 PROCEDURE — 99239 HOSP IP/OBS DSCHRG MGMT >30: CPT | Mod: CS

## 2021-02-12 RX ORDER — APIXABAN 2.5 MG/1
2 TABLET, FILM COATED ORAL
Qty: 8 | Refills: 0
Start: 2021-02-12 | End: 2021-02-16

## 2021-02-12 RX ADMIN — APIXABAN 10 MILLIGRAM(S): 2.5 TABLET, FILM COATED ORAL at 09:34

## 2021-02-12 RX ADMIN — CARVEDILOL PHOSPHATE 6.25 MILLIGRAM(S): 80 CAPSULE, EXTENDED RELEASE ORAL at 09:34

## 2021-02-12 RX ADMIN — Medication 81 MILLIGRAM(S): at 09:34

## 2021-02-12 RX ADMIN — Medication 1 DROP(S): at 09:34

## 2021-02-12 RX ADMIN — Medication 4: at 13:02

## 2021-02-12 NOTE — PROGRESS NOTE ADULT - SUBJECTIVE AND OBJECTIVE BOX
C/C: generalized weakness/cough.    HPI:  86 yo male with PMH of DM2, HTN, HLD presents to ED with complaint of weakness and cough. Pt states he has been having a cough and low grade fevers since January. Feels mild SOB. Cough is non productive. States fever at home was 101. He had a decreased appetite but states has improved in the last 2 days. Has loss of taste and smell. Wife at home positive with COVID. Son also recently had COVID. Pt states he was drinking water at home but maybe not enough. No trouble urinating. No dark urine. Denies any abd pain, N/V/D. No melena or hematochezia. Pt states overall feels very weak and tired.     In the ED patient with no fever. There was a documented SpO2 of 78% on RA. subsequently,  with no O2 on and O2 sat 93-94% at rest. He was also noted to be anemic with hgb of 9.5 (guaiac negative) and OPAL with BUN/Cr of 91/2.6. He was given 1L of NS. CXR shows infiltrates. CT chest shows ground glass opacification. ER spoke with PCP in [facility] Dr. Bandar Larry ([phone]) and stated patient with no history of CKD or anemia in the past.      : pt seen and examined this am. Felt ok. No sob/chest pain. Still with cough. No n/v/d. Hungry , wants to eat. HAsn't eaten since admission as he was npo for vq scan.      Review of system- All 10 systems reviewed and is as per HPI otherwise negative.     VITALS  T(C): 36.2 (21 @ 01:25), Max: 37.2 (21 @ 15:24)  HR: 74 (21 @ 01:25) (63 - 95)  BP: 155/71 (21 @ 01:25) (133/51 - 158/62)  RR: 19 (21 @ 01:25) (19 - 21)  SpO2: 99% (21 @ 01:25) (78% - 99%)      PHYSICAL EXAM:    GENERAL: Comfortable, no acute distress  HEAD:  Atraumatic, Normocephalic  EYES: EOMI, PERRLA  HEENT: very dry mucous membranes  NECK: Supple, No JVD  NERVOUS SYSTEM:  Alert & Oriented X3, Motor Strength 5/5 B/L upper and lower extremities  CHEST/LUNG: decreased bs b/l  HEART: Regular rate and rhythm; No murmurs, rubs, or gallops  ABDOMEN: Soft, Nontender, Nondistended; Bowel sounds present  GENITOURINARY- Voiding, no palpable bladder  EXTREMITIES:  No clubbing, cyanosis, or edema  MUSCULOSKELETAL- No muscle tenderness, No joint tenderness  SKIN-no rash        LABS:                        8.1    6.46  )-----------( 200      ( 2021 07:41 )             25.8     02-    145  |  118<H>  |  76<H>  ----------------------------<  179<H>  4.7   |  19<L>  |  1.93<H>    Ca    8.4<L>      2021 07:41  Phos  3.4     -  Mg     2.0         TPro  5.8<L>  /  Alb  2.0<L>  /  TBili  0.3  /  DBili  x   /  AST  51<H>  /  ALT  32  /  AlkPhos  73  02-    PT/INR - ( 2021 16:43 )   PT: 12.0 sec;   INR: 1.04 ratio         PTT - ( 2021 10:21 )  PTT:> 200 sec  Urinalysis Basic - ( 2021 05:46 )    Color: Yellow / Appearance: Clear / S.020 / pH: x  Gluc: x / Ketone: Negative  / Bili: Negative / Urobili: Negative mg/dL   Blood: x / Protein: 15 mg/dL / Nitrite: Negative   Leuk Esterase: Negative / RBC: Negative /HPF / WBC Negative   Sq Epi: x / Non Sq Epi: Moderate / Bacteria: Many          CARDIAC MARKERS ( 2021 21:39 )  x     / x     / 59 U/L / x     / x        MEDS  acetaminophen   Tablet .. 650 milliGRAM(s) Oral every 4 hours PRN  ALBUTerol    90 MICROgram(s) HFA Inhaler 1 Puff(s) Inhalation every 4 hours  aspirin enteric coated 81 milliGRAM(s) Oral daily  atorvastatin 20 milliGRAM(s) Oral at bedtime  carvedilol 6.25 milliGRAM(s) Oral every 12 hours  dexAMETHasone     Tablet 6 milliGRAM(s) Oral daily  dextrose 40% Gel 15 Gram(s) Oral once  dextrose 5%. 1000 milliLiter(s) IV Continuous <Continuous>  dextrose 5%. 1000 milliLiter(s) IV Continuous <Continuous>  dextrose 50% Injectable 25 Gram(s) IV Push once  dextrose 50% Injectable 12.5 Gram(s) IV Push once  dextrose 50% Injectable 25 Gram(s) IV Push once  glucagon  Injectable 1 milliGRAM(s) IntraMuscular once  heparin   Injectable 7000 Unit(s) IV Push every 6 hours PRN  heparin   Injectable 3500 Unit(s) IV Push every 6 hours PRN  heparin  Infusion.  Unit(s)/Hr IV Continuous <Continuous>  insulin lispro (ADMELOG) corrective regimen sliding scale   SubCutaneous three times a day before meals  insulin lispro (ADMELOG) corrective regimen sliding scale   SubCutaneous at bedtime  latanoprost 0.005% Ophthalmic Solution 1 Drop(s) Both EYES at bedtime  sodium chloride 0.9%. 1000 milliLiter(s) IV Continuous <Continuous>  timolol 0.5% Solution 1 Drop(s) Right EYE daily    ASSESSMENT AND PLAN:    86 yo male with PMH of DM2, HTN, HLD admitted with:    #COVID 19 PNA  #Acute hypoxic respiratory failure (SpO2 78% on RA documented on arrival to ED)  -O2 via NC PRN to maintain SPO2 88-94   -PO decadron  - no remdesevir for now given CrCl <30  - ID consult APPRECIATED.     #Type B lactic acidosis  -likely related to metformin/opal  -resolved.    #LLE DVT:  -dc vq scan, will not change mx.   -dc heparin gtt and change to eliquis.     #OPAL  -continue to hold losartan  -on IV fluids  - nephro consult appreciated, will follow  -no obstruction on imaging.   -monitor creatinine.    #normocytic anemia  - monitor H/H  - stool occult negative  -b12/folate/iron stores adequate  -could be related to renal disease     #DM2  - hold metformin  - hold actos  - continue ss    #HTN  - losartan held secondary to OPAL  - continue coreg    #Advance Directives  - FULL CODE    #dispo:  -f/u bmp in am.   -dc planning once resp/renal status improved.              
Hospital Course	  86 yo male with PMH of DM2, HTN, HLD presents to ED with complaint of weakness and cough. Pt states he has been having a cough and low grade fevers since January. Feels mild SOB. Cough is non productive. States fever at home was 101. He had a decreased appetite but states has improved in the last 2 days. Has loss of taste and smell. Wife at home positive with COVID. Son also recently had COVID. Pt states he was drinking water at home but maybe not enough. No trouble urinating. No dark urine. Denies any abd pain, N/V/D. No melena or hematochezia. Pt states overall feels very weak and tired.     In the ED patient with no fever. There was a documented SpO2 of 78% on RA. subsequently,  with no O2 on and O2 sat 93-94% at rest. He was also noted to be anemic with hgb of 9.5 (guaiac negative) and OPAL with BUN/Cr of 91/2.6. He was given 1L of NS. CXR shows infiltrates. CT chest shows ground glass opacification. ER spoke with PCP in [facility] Dr. Bandar Larry ([phone]) and stated patient with no history of CKD or anemia in the past.      2/9: pt seen and examined this am. Felt ok. No sob/chest pain. Still with cough. No n/v/d. Hungry , wants to eat. HAsn't eaten since admission as he was npo for vq scan  2/10 feels better today, wants to work with PT  2/11 worked with PT , feels better wants to go home, no SOB   2/12 - no events overnight pt being discharged.  instructions reviewed with him by dr gonzáles and re-reviewed by me    Vital Signs Last 24 Hrs  T(C): 36.2 (12 Feb 2021 08:22), Max: 36.7 (11 Feb 2021 22:00)  T(F): 97.2 (12 Feb 2021 08:22), Max: 98 (11 Feb 2021 22:00)  HR: 74 (12 Feb 2021 08:22) (70 - 86)  BP: 153/71 (12 Feb 2021 08:22) (128/56 - 156/72)  BP(mean): --  RR: 16 (12 Feb 2021 08:22) (16 - 17)  SpO2: 99% (12 Feb 2021 08:22) (93% - 99%)      GEN: lying in bed, NAD  HEENT:   NC/AT, pupils equal and reactive, EOMI  CV:  +S1, +S2, RRR  RESP:   lungs clear to auscultation bilaterally, no wheeze, rales, rhonchi   BREAST:  not examined  GI:  abdomen soft, non-tender, non-distended, normoactive BS  RECTAL:  not examined  :  not examined  MSK:   normal muscle tone  EXT:  no edema  NEURO:  AAOX3, no focal neurological deficits  SKIN:  no rashes      86 yo male with PMH of DM2, HTN, HLD admitted with:    #COVID 19 PNA  #Acute hypoxic respiratory failure (SpO2 78% on RA documented on arrival to ED)  weaned off O2, now on RA pulse ox 97%  did not qualify for home o2  -s/p PO decadron  - no remdesevir  due to renal insufficiency  - ID consult APPRECIATED.     #Type B lactic acidosis  -likely related to metformin/opal  -resolved.  decreased dose of  metformin - I discussed with PCP who agreed A1c 6.2, continuie actos, f/u with PCP   gluc elevated inpatient due to decadron, now off decadron   - monitor lactate as outpt     #LLE DVT:  -dc vq scan, will not change mx.   -dc heparin gtt and change to eliquis.       #OPAL  improved , CKD  now close to baseline  cr 1.5 close to baseline    I muna wyatt his PCP [phone]   hold losartan  off IVF  - nephro consult appreciated,   -no obstruction on imaging.       #normocytic anemia , exacerbated by dilutional effect  has thalasemia minor  baseline hct 35 as per PMD, hct 35  - monitor H/H as outpatient f/u with PCP  no evidence of gross blood in stool  -b12/folate/iron stores adequate  -could be related to renal disease   would need further anemia w/u with hematologist    #DM2  decreased dose of metformin and resume actos  at time of discharge     #HTN  - losartan held secondary to OPAL  - continue coreg    #Advance Directives  - FULL CODE     medically stable for discharge  see dc summary from yesterday for details     
NEPHROLOGY CONSULT  HPI:  86 yo male with PMH of DM2, HTN, HLD presents to ED with complaint of weakness and cough. Pt states he has been having a cough and low grade fevers since January. Feels mild SOB. Cough is non productive. States fever at home was 101. He had a decreased appetite but states has improved in the last 2 days. Has loss of taste and smell. Wife at home positive with COVID. Son also recently had COVID. Pt states he was drinking water at home but maybe not enough. No trouble urinating. No dark urine. Denies any abd pain, N/V/D. No melena or hematochezia. Pt states overall feels very weak and tired.     In the ED patient with no fever. There was a documented SpO2 of 78% on RA. On my exam patient with no O2 on and O2 sat 93-94% at rest. He was also noted to be anemic with hgb of 9.5 (guaiac negative) and OPAL with BUN/Cr of 91/2.6. He was given 1L of NS. CXR shows infiltrates. CT chest shows ground glass opacification. [facility] Bandar Larry ([phone]) and stated patient with no history of CKD or anemia in the past.  (2021 19:09)  Above from chart:  Pt asymptomatic, no sob, no cough at this time  excellent UO,VVS  I spoke to Dr Larry as well, he will call back with details , he did not have access to his chart    2/10  Feels great wants to go home  I spoke to his pmd, Dr Larry Pt has creat 1.5 mg/dl past 5 yrs      PAST MEDICAL & SURGICAL HISTORY:  Hyperlipidemia    Hypertension    Type 2 diabetes mellitus        FAMILY HISTORY:  No pertinent family history in first degree relatives        MEDICATIONS  (STANDING):  ALBUTerol    90 MICROgram(s) HFA Inhaler 1 Puff(s) Inhalation every 4 hours  apixaban 10 milliGRAM(s) Oral every 12 hours  aspirin enteric coated 81 milliGRAM(s) Oral daily  atorvastatin 20 milliGRAM(s) Oral at bedtime  carvedilol 6.25 milliGRAM(s) Oral every 12 hours  dextrose 40% Gel 15 Gram(s) Oral once  dextrose 5%. 1000 milliLiter(s) (50 mL/Hr) IV Continuous <Continuous>  dextrose 5%. 1000 milliLiter(s) (100 mL/Hr) IV Continuous <Continuous>  dextrose 50% Injectable 25 Gram(s) IV Push once  dextrose 50% Injectable 12.5 Gram(s) IV Push once  dextrose 50% Injectable 25 Gram(s) IV Push once  glucagon  Injectable 1 milliGRAM(s) IntraMuscular once  insulin glargine Injectable (LANTUS) 7 Unit(s) SubCutaneous every morning  insulin lispro (ADMELOG) corrective regimen sliding scale   SubCutaneous three times a day before meals  insulin lispro (ADMELOG) corrective regimen sliding scale   SubCutaneous at bedtime  latanoprost 0.005% Ophthalmic Solution 1 Drop(s) Both EYES at bedtime  sodium chloride 0.45%. 1000 milliLiter(s) (60 mL/Hr) IV Continuous <Continuous>  timolol 0.5% Solution 1 Drop(s) Right EYE daily    MEDICATIONS  (PRN):  acetaminophen   Tablet .. 650 milliGRAM(s) Oral every 4 hours PRN Temp greater or equal to 38C (100.4F), Mild Pain (1 - 3)        Allergies    No Known Allergies    Intolerances        I&O's Summary    2021 07:01  -  2021 07:00  --------------------------------------------------------  IN: 0 mL / OUT: 200 mL / NET: -200 mL          REVIEW OF SYSTEMS:    CONSTITUTIONAL:  As per HPI.  CONSTITUTIONAL: No weakness, fevers or chills  EYES/ENT: No visual changes;  No vertigo or throat pain   NECK: No pain or stiffness  CARDIOVASCULAR: No chest pain or palpitations  GASTROINTESTINAL: No abdominal or epigastric pain. No nausea, vomiting, or hematemesis; No diarrhea or constipation. No melena or hematochezia.  GENITOURINARY: No dysuria, frequency or hematuria  NEUROLOGICAL: No numbness or weakness  SKIN: No itching, burning, rashes, or lesions   All other review of systems is negative unless indicated above    Vital Signs Last 24 Hrs  T(C): 36.6 (10 Feb 2021 17:08), Max: 36.6 (10 Feb 2021 17:08)  T(F): 97.8 (10 Feb 2021 17:08), Max: 97.8 (10 Feb 2021 17:08)  HR: 72 (10 Feb 2021 17:08) (70 - 74)  BP: 167/67 (10 Feb 2021 17:08) (149/58 - 167/67)  BP(mean): --  RR: 18 (10 Feb 2021 17:08) (17 - 18)  SpO2: 99% (10 Feb 2021 17:) (96% - 99%)    2021 07:01  -  2021 07:00  --------------------------------------------------------  IN: 0 mL / OUT: 200 mL / NET: -200 mL        PHYSICAL EXAM:    General:  Alert, well-developed ,No acute distress.    Neuro:  Alert and oriented to person, place, and time. Able to communicate  well. Cranial nerves 2-12 grossly intact. 5/5 strength in all  extremities bilaterally. Sensation intact in all extremities.  Appropriate affect.     HEENT:  No JVD, no masses, Eyes anicteric, No carotid bruits.No lymphadenopathy,    Cardiovascular:  Regular rate and rhythm, with normal S1 and S2. No murmurs, rubs,  or gallops. No JVD.     Lungs:  clear. no rales, no wheezing, .    Abdomen:  Normoactive bowel sounds. Soft, flat, non-tender, and non-distended.  No hepatosplenomegaly, positive bowel sounds    Skin:  Warm, dry, well-perfused. No rashes or other lesions.     Extremities:  2+ pulses in upper and lower extremities. No lower extremity pain or  edema; legs are symmetric in appearance.    LABS:                          8.6    10.29 )-----------( 253      ( 10 Feb 2021 08:26 )             27.8                           8.1    6.46  )-----------( 200      ( 2021 07:41 )             25.8     02-10    146<H>  |  120<H>  |  68<H>  ----------------------------<  192<H>  4.7   |  19<L>  |  1.65<H>    Ca    9.0      10 Feb 2021 08:26  Phos  2.7     02-10  Mg     1.9     02-10    TPro  5.8<L>  /  Alb  2.0<L>  /  TBili  0.3  /  DBili  x   /  AST  51<H>  /  ALT  32  /  AlkPhos  73      145  |  118<H>  |  76<H>  ----------------------------<  179<H>  4.7   |  19<L>  |  1.93<H>    Ca    8.4<L>      2021 07:41  Phos  3.4       Mg     2.0         TPro  5.8<L>  /  Alb  2.0<L>  /  TBili  0.3  /  DBili  x   /  AST  51<H>  /  ALT  32  /  AlkPhos  73      PT/INR - ( 2021 16:43 )   PT: 12.0 sec;   INR: 1.04 ratio         PTT - ( 2021 10:21 )  PTT:> 200 sec  Urinalysis Basic - ( 2021 05:46 )    Color: Yellow / Appearance: Clear / S.020 / pH: x  Gluc: x / Ketone: Negative  / Bili: Negative / Urobili: Negative mg/dL   Blood: x / Protein: 15 mg/dL / Nitrite: Negative   Leuk Esterase: Negative / RBC: Negative /HPF / WBC Negative   Sq Epi: x / Non Sq Epi: Moderate / Bacteria: Many      Magnesium, Serum: 2.0 mg/dL ( @ 07:41)  Phosphorus Level, Serum: 3.4 mg/dL ( @ 07:41)    Duplex LE : Left gastrocnemius Vein thrombosis      
84 yo male with PMH of DM2, HTN, HLD presents to ED with complaint of weakness and cough. Pt states he has been having a cough and low grade fevers since January. Feels mild SOB. Cough is non productive. States fever at home was 101. He had a decreased appetite but states has improved in the last 2 days. Has loss of taste and smell. Wife at home positive with COVID. Son also recently had COVID. Pt states he was drinking water at home but maybe not enough. No trouble urinating. No dark urine. Denies any abd pain, N/V/D. No melena or hematochezia. Pt states overall feels very weak and tired.     In the ED patient with no fever. There was a documented SpO2 of 78% on RA. subsequently,  with no O2 on and O2 sat 93-94% at rest. He was also noted to be anemic with hgb of 9.5 (guaiac negative) and OPAL with BUN/Cr of 91/2.6. He was given 1L of NS. CXR shows infiltrates. CT chest shows ground glass opacification. ER spoke with PCP in [facility] Dr. Bandar Larry ([phone]) and stated patient with no history of CKD or anemia in the past.      : pt seen and examined this am. Felt ok. No sob/chest pain. Still with cough. No n/v/d. Hungry , wants to eat. HAsn't eaten since admission as he was npo for vq scan  2/10 feels better today, wants to work with PT    .PHYSICAL EXAM:    GENERAL: Comfortable, no acute distress  HEAD:  Atraumatic, Normocephalic  EYES: EOMI, PERRLA  HEENT: very dry mucous membranes  NECK: Supple, No JVD  NERVOUS SYSTEM:  Alert & Oriented X3, Motor Strength 5/5 B/L upper and lower extremities  CHEST/LUNG: decreased bs b/l  HEART: Regular rate and rhythm; No murmurs, rubs, or gallops  ABDOMEN: Soft, Nontender, Nondistended; Bowel sounds present  GENITOURINARY- Voiding, no palpable bladder  EXTREMITIES:  No clubbing, cyanosis, or edema  MUSCULOSKELETAL- No muscle tenderness, No joint tenderness  SKIN-no rash      PHYSICAL EXAM:    Daily     Daily     ICU Vital Signs Last 24 Hrs  T(C): 36.3 (10 Feb 2021 08:50), Max: 36.4 (2021 16:50)  T(F): 97.4 (10 Feb 2021 08:50), Max: 97.6 (2021 16:50)  HR: 70 (10 Feb 2021 08:50) (70 - 77)  BP: 149/58 (10 Feb 2021 08:50) (131/51 - 152/72)  BP(mean): --  ABP: --  ABP(mean): --  RR: 18 (10 Feb 2021 08:50) (17 - 18)  SpO2: 98% (10 Feb 2021 08:50) (96% - 98%)                            8.6    10.29 )-----------( 253      ( 10 Feb 2021 08:26 )             27.8       CBC Full  -  ( 10 Feb 2021 08:26 )  WBC Count : 10.29 K/uL  RBC Count : 3.10 M/uL  Hemoglobin : 8.6 g/dL  Hematocrit : 27.8 %  Platelet Count - Automated : 253 K/uL  Mean Cell Volume : 89.7 fl  Mean Cell Hemoglobin : 27.7 pg  Mean Cell Hemoglobin Concentration : 30.9 gm/dL  Auto Neutrophil # : 8.13 K/uL  Auto Lymphocyte # : 0.84 K/uL  Auto Monocyte # : 0.64 K/uL  Auto Eosinophil # : 0.01 K/uL  Auto Basophil # : 0.01 K/uL  Auto Neutrophil % : 79.0 %  Auto Lymphocyte % : 8.2 %  Auto Monocyte % : 6.2 %  Auto Eosinophil % : 0.1 %  Auto Basophil % : 0.1 %      02-10    146<H>  |  120<H>  |  68<H>  ----------------------------<  192<H>  4.7   |  19<L>  |  1.65<H>    Ca    9.0      10 Feb 2021 08:26  Phos  2.7     02-10  Mg     1.9     02-10    TPro  5.8<L>  /  Alb  2.0<L>  /  TBili  0.3  /  DBili  x   /  AST  51<H>  /  ALT  32  /  AlkPhos  73  02-09      LIVER FUNCTIONS - ( 2021 07:41 )  Alb: 2.0 g/dL / Pro: 5.8 gm/dL / ALK PHOS: 73 U/L / ALT: 32 U/L / AST: 51 U/L / GGT: x             PT/INR - ( 2021 16:43 )   PT: 12.0 sec;   INR: 1.04 ratio         PTT - ( 2021 20:45 )  PTT:157.1 sec    CARDIAC MARKERS ( 2021 21:39 )  x     / x     / 59 U/L / x     / x            Urinalysis Basic - ( 2021 05:46 )    Color: Yellow / Appearance: Clear / S.020 / pH: x  Gluc: x / Ketone: Negative  / Bili: Negative / Urobili: Negative mg/dL   Blood: x / Protein: 15 mg/dL / Nitrite: Negative   Leuk Esterase: Negative / RBC: Negative /HPF / WBC Negative   Sq Epi: x / Non Sq Epi: Moderate / Bacteria: Many            MEDICATIONS  (STANDING):  ALBUTerol    90 MICROgram(s) HFA Inhaler 1 Puff(s) Inhalation every 4 hours  apixaban 10 milliGRAM(s) Oral every 12 hours  aspirin enteric coated 81 milliGRAM(s) Oral daily  atorvastatin 20 milliGRAM(s) Oral at bedtime  carvedilol 6.25 milliGRAM(s) Oral every 12 hours  dextrose 40% Gel 15 Gram(s) Oral once  dextrose 5%. 1000 milliLiter(s) (50 mL/Hr) IV Continuous <Continuous>  dextrose 5%. 1000 milliLiter(s) (100 mL/Hr) IV Continuous <Continuous>  dextrose 50% Injectable 25 Gram(s) IV Push once  dextrose 50% Injectable 12.5 Gram(s) IV Push once  dextrose 50% Injectable 25 Gram(s) IV Push once  glucagon  Injectable 1 milliGRAM(s) IntraMuscular once  insulin glargine Injectable (LANTUS) 7 Unit(s) SubCutaneous every morning  insulin lispro (ADMELOG) corrective regimen sliding scale   SubCutaneous three times a day before meals  insulin lispro (ADMELOG) corrective regimen sliding scale   SubCutaneous at bedtime  latanoprost 0.005% Ophthalmic Solution 1 Drop(s) Both EYES at bedtime  sodium chloride 0.45%. 1000 milliLiter(s) (60 mL/Hr) IV Continuous <Continuous>  timolol 0.5% Solution 1 Drop(s) Right EYE daily          
Date of service: 02-10-21 @ 12:34    Feels better  No SOB at rest  Minimal cough  On RA    ROS: no fever or chills; denies dizziness, no HA, no abdominal pain, no diarrhea or constipation; no dysuria, no legs pain, no rashes    MEDICATIONS  (STANDING):  ALBUTerol    90 MICROgram(s) HFA Inhaler 1 Puff(s) Inhalation every 4 hours  apixaban 10 milliGRAM(s) Oral every 12 hours  aspirin enteric coated 81 milliGRAM(s) Oral daily  atorvastatin 20 milliGRAM(s) Oral at bedtime  carvedilol 6.25 milliGRAM(s) Oral every 12 hours  dextrose 40% Gel 15 Gram(s) Oral once  dextrose 5%. 1000 milliLiter(s) (50 mL/Hr) IV Continuous <Continuous>  dextrose 5%. 1000 milliLiter(s) (100 mL/Hr) IV Continuous <Continuous>  dextrose 50% Injectable 25 Gram(s) IV Push once  dextrose 50% Injectable 12.5 Gram(s) IV Push once  dextrose 50% Injectable 25 Gram(s) IV Push once  glucagon  Injectable 1 milliGRAM(s) IntraMuscular once  insulin glargine Injectable (LANTUS) 7 Unit(s) SubCutaneous once  insulin glargine Injectable (LANTUS) 7 Unit(s) SubCutaneous every morning  insulin lispro (ADMELOG) corrective regimen sliding scale   SubCutaneous three times a day before meals  insulin lispro (ADMELOG) corrective regimen sliding scale   SubCutaneous at bedtime  latanoprost 0.005% Ophthalmic Solution 1 Drop(s) Both EYES at bedtime  sodium chloride 0.45%. 1000 milliLiter(s) (70 mL/Hr) IV Continuous <Continuous>  timolol 0.5% Solution 1 Drop(s) Right EYE daily    Vital Signs Last 24 Hrs  T(C): 36.3 (10 Feb 2021 08:50), Max: 36.4 (2021 16:50)  T(F): 97.4 (10 Feb 2021 08:50), Max: 97.6 (2021 16:50)  HR: 70 (10 Feb 2021 08:50) (70 - 77)  BP: 149/58 (10 Feb 2021 08:50) (131/51 - 152/72)  BP(mean): --  RR: 18 (10 Feb 2021 08:50) (17 - 18)  SpO2: 98% (10 Feb 2021 08:50) (96% - 98%)     Physical exam:    Constitutional:  No acute distress  HEENT: NC/AT, EOMI, PERRLA, conjunctivae clear  Neck: supple; thyroid not palpable  Back: no tenderness  Respiratory: respiratory effort normal; crackles b/l  Cardiovascular: S1S2 regular, no murmurs  Abdomen: soft, not tender, not distended, positive BS  Genitourinary: no suprapubic tenderness  Lymphatic: no LN palpable  Musculoskeletal: no muscle tenderness, no joint swelling or tenderness  Extremities: no pedal edema  Left calf edema; no tenderness  Neurological/ Psychiatric: AxOx3, judgement and insight normal; moving all extremities  Skin: no rashes; no palpable lesions    Labs: all available labs reviewed                        8.1    6.46  )-----------( 200      ( 2021 07:41 )             25.8         145  |  118<H>  |  76<H>  ----------------------------<  179<H>  4.7   |  19<L>  |  1.93<H>    Ca    8.4<L>      2021 07:41  Phos  3.4       Mg     2.0         TPro  5.8<L>  /  Alb  2.0<L>  /  TBili  0.3  /  DBili  x   /  AST  51<H>  /  ALT  32  /  AlkPhos  73  09     LIVER FUNCTIONS - ( 2021 07:41 )  Alb: 2.0 g/dL / Pro: 5.8 gm/dL / ALK PHOS: 73 U/L / ALT: 32 U/L / AST: 51 U/L / GGT: x           Urinalysis Basic - ( 2021 05:46 )    Color: Yellow / Appearance: Clear / S.020 / pH: x  Gluc: x / Ketone: Negative  / Bili: Negative / Urobili: Negative mg/dL   Blood: x / Protein: 15 mg/dL / Nitrite: Negative   Leuk Esterase: Negative / RBC: Negative /HPF / WBC Negative   Sq Epi: x / Non Sq Epi: Moderate / Bacteria: Many    COVID-19 PCR: Detected (21 @ 16:43)      Culture - Blood (collected 2021 16:43)  Source: .Blood None  Preliminary Report (10 Feb 2021 01:02):    No growth to date.    Radiology: all available radiological tests reviewed    < from: CT Abdomen and Pelvis No Cont (21 @ 20:52) >  Bilateral groundglass opacities in the lung consistent with COVID 19.  No evidence of hydronephrosis or hydroureter.  Fat-containing periumbilical hernia and bilateral inguinal hernias (RIGHT greater than LEFT).  Elongated linear calcification within the SMA approximately 3 cm from its origin.    < end of copied text >      Advanced directives addressed: full resuscitation

## 2021-02-13 ENCOUNTER — TRANSCRIPTION ENCOUNTER (OUTPATIENT)
Age: 86
End: 2021-02-13

## 2021-02-14 LAB
CULTURE RESULTS: SIGNIFICANT CHANGE UP
SPECIMEN SOURCE: SIGNIFICANT CHANGE UP

## 2021-02-16 ENCOUNTER — TRANSCRIPTION ENCOUNTER (OUTPATIENT)
Age: 86
End: 2021-02-16

## 2021-02-17 DIAGNOSIS — Z87.891 PERSONAL HISTORY OF NICOTINE DEPENDENCE: ICD-10-CM

## 2021-02-17 DIAGNOSIS — J12.82 PNEUMONIA DUE TO CORONAVIRUS DISEASE 2019: ICD-10-CM

## 2021-02-17 DIAGNOSIS — J96.01 ACUTE RESPIRATORY FAILURE WITH HYPOXIA: ICD-10-CM

## 2021-02-17 DIAGNOSIS — D56.3 THALASSEMIA MINOR: ICD-10-CM

## 2021-02-17 DIAGNOSIS — E87.8 OTHER DISORDERS OF ELECTROLYTE AND FLUID BALANCE, NOT ELSEWHERE CLASSIFIED: ICD-10-CM

## 2021-02-17 DIAGNOSIS — R05 COUGH: ICD-10-CM

## 2021-02-17 DIAGNOSIS — N18.9 CHRONIC KIDNEY DISEASE, UNSPECIFIED: ICD-10-CM

## 2021-02-17 DIAGNOSIS — E11.22 TYPE 2 DIABETES MELLITUS WITH DIABETIC CHRONIC KIDNEY DISEASE: ICD-10-CM

## 2021-02-17 DIAGNOSIS — E87.0 HYPEROSMOLALITY AND HYPERNATREMIA: ICD-10-CM

## 2021-02-17 DIAGNOSIS — Z79.84 LONG TERM (CURRENT) USE OF ORAL HYPOGLYCEMIC DRUGS: ICD-10-CM

## 2021-02-17 DIAGNOSIS — E87.2 ACIDOSIS: ICD-10-CM

## 2021-02-17 DIAGNOSIS — I82.402 ACUTE EMBOLISM AND THROMBOSIS OF UNSPECIFIED DEEP VEINS OF LEFT LOWER EXTREMITY: ICD-10-CM

## 2021-02-17 DIAGNOSIS — E78.5 HYPERLIPIDEMIA, UNSPECIFIED: ICD-10-CM

## 2021-02-17 DIAGNOSIS — U07.1 COVID-19: ICD-10-CM

## 2021-02-17 DIAGNOSIS — I12.9 HYPERTENSIVE CHRONIC KIDNEY DISEASE WITH STAGE 1 THROUGH STAGE 4 CHRONIC KIDNEY DISEASE, OR UNSPECIFIED CHRONIC KIDNEY DISEASE: ICD-10-CM

## 2021-02-17 DIAGNOSIS — N17.9 ACUTE KIDNEY FAILURE, UNSPECIFIED: ICD-10-CM

## 2021-04-23 PROBLEM — E11.9 TYPE 2 DIABETES MELLITUS WITHOUT COMPLICATIONS: Chronic | Status: ACTIVE | Noted: 2021-02-08

## 2021-04-23 PROBLEM — I10 ESSENTIAL (PRIMARY) HYPERTENSION: Chronic | Status: ACTIVE | Noted: 2021-02-08

## 2021-04-23 PROBLEM — E78.5 HYPERLIPIDEMIA, UNSPECIFIED: Chronic | Status: ACTIVE | Noted: 2021-02-08

## 2021-04-26 ENCOUNTER — OUTPATIENT (OUTPATIENT)
Dept: OUTPATIENT SERVICES | Facility: HOSPITAL | Age: 86
LOS: 1 days | End: 2021-04-26
Payer: MEDICARE

## 2021-04-26 ENCOUNTER — APPOINTMENT (OUTPATIENT)
Dept: RADIOLOGY | Facility: CLINIC | Age: 86
End: 2021-04-26
Payer: MEDICARE

## 2021-04-26 DIAGNOSIS — Z00.8 ENCOUNTER FOR OTHER GENERAL EXAMINATION: ICD-10-CM

## 2021-04-26 PROCEDURE — 71046 X-RAY EXAM CHEST 2 VIEWS: CPT

## 2021-04-26 PROCEDURE — 71046 X-RAY EXAM CHEST 2 VIEWS: CPT | Mod: 26

## 2021-09-09 ENCOUNTER — APPOINTMENT (OUTPATIENT)
Dept: OPHTHALMOLOGY | Facility: CLINIC | Age: 86
End: 2021-09-09
Payer: MEDICARE

## 2021-09-09 ENCOUNTER — NON-APPOINTMENT (OUTPATIENT)
Age: 86
End: 2021-09-09

## 2021-09-09 PROCEDURE — 92002 INTRM OPH EXAM NEW PATIENT: CPT

## 2021-09-09 PROCEDURE — 76514 ECHO EXAM OF EYE THICKNESS: CPT

## 2021-09-09 PROCEDURE — 92133 CPTRZD OPH DX IMG PST SGM ON: CPT

## 2022-01-27 ENCOUNTER — NON-APPOINTMENT (OUTPATIENT)
Age: 87
End: 2022-01-27

## 2022-01-27 ENCOUNTER — APPOINTMENT (OUTPATIENT)
Dept: OPHTHALMOLOGY | Facility: CLINIC | Age: 87
End: 2022-01-27
Payer: MEDICARE

## 2022-01-27 PROCEDURE — 92012 INTRM OPH EXAM EST PATIENT: CPT

## 2022-06-23 ENCOUNTER — NON-APPOINTMENT (OUTPATIENT)
Age: 87
End: 2022-06-23

## 2022-06-23 ENCOUNTER — APPOINTMENT (OUTPATIENT)
Dept: OPHTHALMOLOGY | Facility: CLINIC | Age: 87
End: 2022-06-23
Payer: MEDICARE

## 2022-06-23 PROCEDURE — 92083 EXTENDED VISUAL FIELD XM: CPT

## 2022-06-23 PROCEDURE — 92012 INTRM OPH EXAM EST PATIENT: CPT

## 2022-06-23 PROCEDURE — 92133 CPTRZD OPH DX IMG PST SGM ON: CPT

## 2022-08-11 NOTE — DISCHARGE NOTE NURSING/CASE MANAGEMENT/SOCIAL WORK - NSDCPEELIQUISFU_GEN_ALL_CORE
Go for blood tests as directed. Your doctor will do lab tests at regular visits to monitor the effects of this medicine. Please follow up with your doctor and keep your health care provider appointments.
tachycardic

## 2023-01-12 ENCOUNTER — APPOINTMENT (OUTPATIENT)
Dept: OPHTHALMOLOGY | Facility: CLINIC | Age: 88
End: 2023-01-12
Payer: MEDICARE

## 2023-01-12 ENCOUNTER — NON-APPOINTMENT (OUTPATIENT)
Age: 88
End: 2023-01-12

## 2023-01-12 PROCEDURE — 92083 EXTENDED VISUAL FIELD XM: CPT

## 2023-01-12 PROCEDURE — 92012 INTRM OPH EXAM EST PATIENT: CPT

## 2023-06-08 ENCOUNTER — NON-APPOINTMENT (OUTPATIENT)
Age: 88
End: 2023-06-08

## 2023-06-08 ENCOUNTER — APPOINTMENT (OUTPATIENT)
Dept: OPHTHALMOLOGY | Facility: CLINIC | Age: 88
End: 2023-06-08
Payer: MEDICARE

## 2023-06-08 PROCEDURE — 92012 INTRM OPH EXAM EST PATIENT: CPT

## 2023-06-08 PROCEDURE — 92133 CPTRZD OPH DX IMG PST SGM ON: CPT

## 2024-03-28 ENCOUNTER — NON-APPOINTMENT (OUTPATIENT)
Age: 89
End: 2024-03-28

## 2024-03-28 ENCOUNTER — APPOINTMENT (OUTPATIENT)
Dept: OPHTHALMOLOGY | Facility: CLINIC | Age: 89
End: 2024-03-28
Payer: MEDICARE

## 2024-03-28 PROCEDURE — 92012 INTRM OPH EXAM EST PATIENT: CPT

## 2024-03-28 PROCEDURE — 92083 EXTENDED VISUAL FIELD XM: CPT

## 2024-04-07 NOTE — CONSULT NOTE ADULT - CONSULT REQUESTED BY NAME
Dr. Almaguer
Hospitalist service
[Abdominal Pain] : no abdominal pain
[Negative] : Neurological
[FreeTextEntry1] : see HPI 
[de-identified] : flat affect
[Immunizations are up to date by report] : Immunizations are up to date by report

## 2024-06-15 NOTE — DISCHARGE NOTE NURSING/CASE MANAGEMENT/SOCIAL WORK - NSTRANSFERBELONGINGSRESP_GEN_A_NUR
Please attend your wound and clean it every day with warm soap and water at least 3 times daily.    Please do a sitz bath which means you sit in a warm bathtub with no soap for least 1520 minutes twice daily.    Take ibuprofen and Tylenol for pain please take 500mg of tylenol every 4 hours as well as 600mg of ibuprofen every 6 hours for pain. Do not take more than 4,000mg of tylenol in 24hrs.      Return for any worsening pain, swelling, fevers chills or any other concerning symptoms  
yes

## 2024-07-04 ENCOUNTER — INPATIENT (INPATIENT)
Facility: HOSPITAL | Age: 89
LOS: 5 days | Discharge: HOSPICE | DRG: 871 | End: 2024-07-10
Attending: FAMILY MEDICINE | Admitting: STUDENT IN AN ORGANIZED HEALTH CARE EDUCATION/TRAINING PROGRAM
Payer: MEDICARE

## 2024-07-04 VITALS
RESPIRATION RATE: 16 BRPM | TEMPERATURE: 97 F | SYSTOLIC BLOOD PRESSURE: 150 MMHG | DIASTOLIC BLOOD PRESSURE: 60 MMHG | HEART RATE: 77 BPM | OXYGEN SATURATION: 97 %

## 2024-07-04 DIAGNOSIS — E87.20 ACIDOSIS, UNSPECIFIED: ICD-10-CM

## 2024-07-04 DIAGNOSIS — K63.1 PERFORATION OF INTESTINE (NONTRAUMATIC): ICD-10-CM

## 2024-07-04 DIAGNOSIS — C78.00 SECONDARY MALIGNANT NEOPLASM OF UNSPECIFIED LUNG: ICD-10-CM

## 2024-07-04 DIAGNOSIS — Z66 DO NOT RESUSCITATE: ICD-10-CM

## 2024-07-04 DIAGNOSIS — Z79.899 OTHER LONG TERM (CURRENT) DRUG THERAPY: ICD-10-CM

## 2024-07-04 DIAGNOSIS — C78.7 SECONDARY MALIGNANT NEOPLASM OF LIVER AND INTRAHEPATIC BILE DUCT: ICD-10-CM

## 2024-07-04 DIAGNOSIS — R19.8 OTHER SPECIFIED SYMPTOMS AND SIGNS INVOLVING THE DIGESTIVE SYSTEM AND ABDOMEN: ICD-10-CM

## 2024-07-04 DIAGNOSIS — Z79.82 LONG TERM (CURRENT) USE OF ASPIRIN: ICD-10-CM

## 2024-07-04 DIAGNOSIS — Z51.5 ENCOUNTER FOR PALLIATIVE CARE: ICD-10-CM

## 2024-07-04 DIAGNOSIS — K26.9 DUODENAL ULCER, UNSPECIFIED AS ACUTE OR CHRONIC, WITHOUT HEMORRHAGE OR PERFORATION: ICD-10-CM

## 2024-07-04 DIAGNOSIS — Z11.52 ENCOUNTER FOR SCREENING FOR COVID-19: ICD-10-CM

## 2024-07-04 DIAGNOSIS — E78.5 HYPERLIPIDEMIA, UNSPECIFIED: ICD-10-CM

## 2024-07-04 DIAGNOSIS — R62.7 ADULT FAILURE TO THRIVE: ICD-10-CM

## 2024-07-04 DIAGNOSIS — C25.0 MALIGNANT NEOPLASM OF HEAD OF PANCREAS: ICD-10-CM

## 2024-07-04 DIAGNOSIS — N17.0 ACUTE KIDNEY FAILURE WITH TUBULAR NECROSIS: ICD-10-CM

## 2024-07-04 DIAGNOSIS — Z79.84 LONG TERM (CURRENT) USE OF ORAL HYPOGLYCEMIC DRUGS: ICD-10-CM

## 2024-07-04 DIAGNOSIS — E11.9 TYPE 2 DIABETES MELLITUS WITHOUT COMPLICATIONS: ICD-10-CM

## 2024-07-04 DIAGNOSIS — A41.9 SEPSIS, UNSPECIFIED ORGANISM: ICD-10-CM

## 2024-07-04 DIAGNOSIS — Z79.01 LONG TERM (CURRENT) USE OF ANTICOAGULANTS: ICD-10-CM

## 2024-07-04 DIAGNOSIS — I10 ESSENTIAL (PRIMARY) HYPERTENSION: ICD-10-CM

## 2024-07-04 DIAGNOSIS — E43 UNSPECIFIED SEVERE PROTEIN-CALORIE MALNUTRITION: ICD-10-CM

## 2024-07-04 DIAGNOSIS — R65.20 SEVERE SEPSIS WITHOUT SEPTIC SHOCK: ICD-10-CM

## 2024-07-04 LAB
ALBUMIN SERPL ELPH-MCNC: 2.4 G/DL — LOW (ref 3.3–5)
ALP SERPL-CCNC: 469 U/L — HIGH (ref 40–120)
ALT FLD-CCNC: 37 U/L — SIGNIFICANT CHANGE UP (ref 12–78)
AMMONIA BLD-MCNC: 85 UMOL/L — HIGH (ref 11–32)
ANION GAP SERPL CALC-SCNC: 8 MMOL/L — SIGNIFICANT CHANGE UP (ref 5–17)
APPEARANCE UR: CLEAR — SIGNIFICANT CHANGE UP
AST SERPL-CCNC: 90 U/L — HIGH (ref 15–37)
BACTERIA # UR AUTO: ABNORMAL /HPF
BASOPHILS # BLD AUTO: 0.06 K/UL — SIGNIFICANT CHANGE UP (ref 0–0.2)
BASOPHILS NFR BLD AUTO: 0.3 % — SIGNIFICANT CHANGE UP (ref 0–2)
BILIRUB SERPL-MCNC: 7 MG/DL — HIGH (ref 0.2–1.2)
BILIRUB UR-MCNC: NEGATIVE — SIGNIFICANT CHANGE UP
BUN SERPL-MCNC: 68 MG/DL — HIGH (ref 7–23)
CALCIUM SERPL-MCNC: 10.3 MG/DL — HIGH (ref 8.5–10.1)
CHLORIDE SERPL-SCNC: 106 MMOL/L — SIGNIFICANT CHANGE UP (ref 96–108)
CO2 SERPL-SCNC: 24 MMOL/L — SIGNIFICANT CHANGE UP (ref 22–31)
COLOR SPEC: YELLOW — SIGNIFICANT CHANGE UP
CREAT SERPL-MCNC: 2.36 MG/DL — HIGH (ref 0.5–1.3)
DIFF PNL FLD: NEGATIVE — SIGNIFICANT CHANGE UP
EGFR: 26 ML/MIN/1.73M2 — LOW
EOSINOPHIL # BLD AUTO: 0.17 K/UL — SIGNIFICANT CHANGE UP (ref 0–0.5)
EOSINOPHIL NFR BLD AUTO: 0.8 % — SIGNIFICANT CHANGE UP (ref 0–6)
EPI CELLS # UR: PRESENT
FLUAV AG NPH QL: SIGNIFICANT CHANGE UP
FLUBV AG NPH QL: SIGNIFICANT CHANGE UP
GLUCOSE SERPL-MCNC: 307 MG/DL — HIGH (ref 70–99)
GLUCOSE UR QL: NEGATIVE MG/DL — SIGNIFICANT CHANGE UP
HCT VFR BLD CALC: 29.6 % — LOW (ref 39–50)
HGB BLD-MCNC: 9.7 G/DL — LOW (ref 13–17)
IMM GRANULOCYTES NFR BLD AUTO: 1.6 % — HIGH (ref 0–0.9)
KETONES UR-MCNC: NEGATIVE MG/DL — SIGNIFICANT CHANGE UP
LACTATE SERPL-SCNC: 2.4 MMOL/L — HIGH (ref 0.7–2)
LACTATE SERPL-SCNC: 2.5 MMOL/L — HIGH (ref 0.7–2)
LEUKOCYTE ESTERASE UR-ACNC: NEGATIVE — SIGNIFICANT CHANGE UP
LIDOCAIN IGE QN: 115 U/L — HIGH (ref 13–75)
LYMPHOCYTES # BLD AUTO: 1.09 K/UL — SIGNIFICANT CHANGE UP (ref 1–3.3)
LYMPHOCYTES # BLD AUTO: 5 % — LOW (ref 13–44)
MAGNESIUM SERPL-MCNC: 2.2 MG/DL — SIGNIFICANT CHANGE UP (ref 1.6–2.6)
MCHC RBC-ENTMCNC: 28 PG — SIGNIFICANT CHANGE UP (ref 27–34)
MCHC RBC-ENTMCNC: 32.8 GM/DL — SIGNIFICANT CHANGE UP (ref 32–36)
MCV RBC AUTO: 85.5 FL — SIGNIFICANT CHANGE UP (ref 80–100)
MONOCYTES # BLD AUTO: 1.18 K/UL — HIGH (ref 0–0.9)
MONOCYTES NFR BLD AUTO: 5.4 % — SIGNIFICANT CHANGE UP (ref 2–14)
NEUTROPHILS # BLD AUTO: 18.93 K/UL — HIGH (ref 1.8–7.4)
NEUTROPHILS NFR BLD AUTO: 86.9 % — HIGH (ref 43–77)
NITRITE UR-MCNC: NEGATIVE — SIGNIFICANT CHANGE UP
PH UR: 7.5 — SIGNIFICANT CHANGE UP (ref 5–8)
PHOSPHATE SERPL-MCNC: 2.3 MG/DL — LOW (ref 2.5–4.5)
PLATELET # BLD AUTO: 238 K/UL — SIGNIFICANT CHANGE UP (ref 150–400)
POTASSIUM SERPL-MCNC: 4.2 MMOL/L — SIGNIFICANT CHANGE UP (ref 3.5–5.3)
POTASSIUM SERPL-SCNC: 4.2 MMOL/L — SIGNIFICANT CHANGE UP (ref 3.5–5.3)
PROT SERPL-MCNC: 6.5 GM/DL — SIGNIFICANT CHANGE UP (ref 6–8.3)
PROT UR-MCNC: NEGATIVE MG/DL — SIGNIFICANT CHANGE UP
RBC # BLD: 3.46 M/UL — LOW (ref 4.2–5.8)
RBC # FLD: 15.4 % — HIGH (ref 10.3–14.5)
RBC CASTS # UR COMP ASSIST: 0 /HPF — SIGNIFICANT CHANGE UP (ref 0–4)
RSV RNA NPH QL NAA+NON-PROBE: SIGNIFICANT CHANGE UP
SARS-COV-2 RNA SPEC QL NAA+PROBE: SIGNIFICANT CHANGE UP
SODIUM SERPL-SCNC: 138 MMOL/L — SIGNIFICANT CHANGE UP (ref 135–145)
SP GR SPEC: <1.005 — LOW (ref 1–1.03)
UROBILINOGEN FLD QL: 0.2 MG/DL — SIGNIFICANT CHANGE UP (ref 0.2–1)
WBC # BLD: 21.77 K/UL — HIGH (ref 3.8–10.5)
WBC # FLD AUTO: 21.77 K/UL — HIGH (ref 3.8–10.5)
WBC UR QL: 20 /HPF — HIGH (ref 0–5)

## 2024-07-04 PROCEDURE — 82962 GLUCOSE BLOOD TEST: CPT

## 2024-07-04 PROCEDURE — 74176 CT ABD & PELVIS W/O CONTRAST: CPT | Mod: 26,MC

## 2024-07-04 PROCEDURE — 80048 BASIC METABOLIC PNL TOTAL CA: CPT

## 2024-07-04 PROCEDURE — 71045 X-RAY EXAM CHEST 1 VIEW: CPT | Mod: 26

## 2024-07-04 PROCEDURE — 85027 COMPLETE CBC AUTOMATED: CPT

## 2024-07-04 PROCEDURE — 87635 SARS-COV-2 COVID-19 AMP PRB: CPT

## 2024-07-04 PROCEDURE — 83605 ASSAY OF LACTIC ACID: CPT

## 2024-07-04 PROCEDURE — 83735 ASSAY OF MAGNESIUM: CPT

## 2024-07-04 PROCEDURE — 93010 ELECTROCARDIOGRAM REPORT: CPT

## 2024-07-04 PROCEDURE — 99285 EMERGENCY DEPT VISIT HI MDM: CPT

## 2024-07-04 PROCEDURE — 80053 COMPREHEN METABOLIC PANEL: CPT

## 2024-07-04 PROCEDURE — 36415 COLL VENOUS BLD VENIPUNCTURE: CPT

## 2024-07-04 PROCEDURE — 71250 CT THORAX DX C-: CPT | Mod: 26,MC

## 2024-07-04 RX ORDER — SODIUM CHLORIDE 0.9 % (FLUSH) 0.9 %
2000 SYRINGE (ML) INJECTION ONCE
Refills: 0 | Status: COMPLETED | OUTPATIENT
Start: 2024-07-04 | End: 2024-07-04

## 2024-07-04 RX ORDER — SODIUM CHLORIDE 0.9 % (FLUSH) 0.9 %
1000 SYRINGE (ML) INJECTION
Refills: 0 | Status: DISCONTINUED | OUTPATIENT
Start: 2024-07-04 | End: 2024-07-10

## 2024-07-04 RX ORDER — PIPERACILLIN SODIUM AND TAZOBACTAM SODIUM 3; .375 G/15ML; G/15ML
3.38 INJECTION, POWDER, LYOPHILIZED, FOR SOLUTION INTRAVENOUS ONCE
Refills: 0 | Status: COMPLETED | OUTPATIENT
Start: 2024-07-04 | End: 2024-07-04

## 2024-07-04 RX ORDER — ACETAMINOPHEN 325 MG
650 TABLET ORAL EVERY 6 HOURS
Refills: 0 | Status: DISCONTINUED | OUTPATIENT
Start: 2024-07-04 | End: 2024-07-05

## 2024-07-04 RX ORDER — SODIUM CHLORIDE 0.9 % (FLUSH) 0.9 %
1000 SYRINGE (ML) INJECTION ONCE
Refills: 0 | Status: COMPLETED | OUTPATIENT
Start: 2024-07-04 | End: 2024-07-04

## 2024-07-04 RX ORDER — MORPHINE SULFATE 100 MG/1
2 TABLET, EXTENDED RELEASE ORAL EVERY 4 HOURS
Refills: 0 | Status: DISCONTINUED | OUTPATIENT
Start: 2024-07-04 | End: 2024-07-10

## 2024-07-04 RX ADMIN — Medication 1000 MILLILITER(S): at 18:30

## 2024-07-04 RX ADMIN — Medication 2000 MILLILITER(S): at 20:40

## 2024-07-04 RX ADMIN — PIPERACILLIN SODIUM AND TAZOBACTAM SODIUM 200 GRAM(S): 3; .375 INJECTION, POWDER, LYOPHILIZED, FOR SOLUTION INTRAVENOUS at 21:11

## 2024-07-04 NOTE — ED ADULT NURSE NOTE - NSFALLHARMRISKINTERV_ED_ALL_ED
Assistance OOB with selected safe patient handling equipment if applicable/Assistance with ambulation/Communicate risk of Fall with Harm to all staff, patient, and family/Monitor gait and stability/Monitor for mental status changes and reorient to person, place, and time, as needed/Provide visual cue: red socks, yellow wristband, yellow gown, etc/Reinforce activity limits and safety measures with patient and family/Toileting schedule using arm’s reach rule for commode and bathroom/Use of alarms - bed, stretcher, chair and/or video monitoring/Bed in lowest position, wheels locked, appropriate side rails in place/Call bell, personal items and telephone in reach/Instruct patient to call for assistance before getting out of bed/chair/stretcher/Non-slip footwear applied when patient is off stretcher/[place] to call system/Physically safe environment - no spills, clutter or unnecessary equipment/Purposeful Proactive Rounding/Room/bathroom lighting operational, light cord in reach

## 2024-07-04 NOTE — ED ADULT NURSE REASSESSMENT NOTE - NS ED NURSE REASSESS COMMENT FT1
Pt resting comfortably in bed with no acute distress noted. Pt. updated on their status, the current plan of care, and available results no needs or requests at this time. Call bell within reach.

## 2024-07-04 NOTE — PHARMACOTHERAPY INTERVENTION NOTE - COMMENTS
Medication history complete. Medications and allergies reviewed with patient's son Randy and confirmed with .

## 2024-07-04 NOTE — ED PROVIDER NOTE - NSICDXPASTMEDICALHX_GEN_ALL_CORE_FT
PAST MEDICAL HISTORY:  Hyperlipidemia     Hypertension     Type 2 diabetes mellitus       Pancreatic cancer

## 2024-07-04 NOTE — ED ADULT TRIAGE NOTE - CHIEF COMPLAINT QUOTE
biba from home with decreased PO intake. recent diagnosis of Pancreatic CA. no reports of fevers, chills.

## 2024-07-04 NOTE — ED PROVIDER NOTE - CLINICAL SUMMARY MEDICAL DECISION MAKING FREE TEXT BOX
Patient presented with failure to thrive, labs demonstrate a leukocytosis of 21.77, hemoglobin 9.7, OPAL on CKD with creatinine 2.36, BUN 68.  Elevated lactate 2.5, slightly downtrending to 2.4 on recheck.  Ammonia elevated 85.  Lipase slightly elevated 115.  Trace leukocytes, 20 white cells, moderate bacteria, present epithelial cells on UA, culture sent.  Scan of the CT chest abdomen pelvis demonstrated gas and fluid collection between the pancreatic head and gastric antrum suspicious for contained gastroduodenal perforation, 7 cm masslike enlargement of the pancreatic head, extensive pulmonary and hepatic metastases.  Surgery consulted, recommending continued IV antibiotics, fluids, and state that patient can go to medicine service, no acute surgical intervention at this time.  I discussed with family MOST form filled out and placed in chart, patient DNR, DNI, agree to IV antibiotics and IV fluids.  We discussed social work and palliative care consults for a.m. as well.  I discussed with medicine attending Dr. Mercado, will admit to medicine service.

## 2024-07-04 NOTE — CONSULT NOTE ADULT - ASSESSMENT
87 yo male with likely a contained gastric vs duodenal perforation  Patient's mental status has deteriorated acutely over the last 12 hours  Stage 4 metastatic cancer with poor prognosis    There is no surgical intervention offered at this time since patient is a poor surgical candidate with a poor prognosis  As per the family, the patient had expressed his wish to be home hospice  Will recommend palliative care consult

## 2024-07-04 NOTE — ED PROVIDER NOTE - OBJECTIVE STATEMENT
87 y/o male with a PMHx of DM2, HTN, HLD, recently diagnosed stage 4 pancreatic cancer presents to the ED for failure to thrive. Pt with increasing fatigue and decreased PO intake over the last few days. No reported fever, vomiting, diarrhea, pain.

## 2024-07-04 NOTE — ED ADULT NURSE NOTE - OBJECTIVE STATEMENT
pt presents to ed c/o weakness, decreased PO intake, lethargy. pt recently diagnosed with pancreatic cancer 3 days ago. since Monday, pt has almost completely stopped eating and has not been able to ambulate. pt hx htn. pt wife and son at bedside. as per wife and son, pt baseline mental status is a&o x4; "very sharp". upon assessment, pt is a&o x1-2. pt denies pain. no other complaints. EKG done. pt placed on monitors. PIV inserted in L AC and blood sent to lab.

## 2024-07-05 DIAGNOSIS — K76.9 LIVER DISEASE, UNSPECIFIED: ICD-10-CM

## 2024-07-05 DIAGNOSIS — C25.9 MALIGNANT NEOPLASM OF PANCREAS, UNSPECIFIED: ICD-10-CM

## 2024-07-05 DIAGNOSIS — R19.8 OTHER SPECIFIED SYMPTOMS AND SIGNS INVOLVING THE DIGESTIVE SYSTEM AND ABDOMEN: ICD-10-CM

## 2024-07-05 DIAGNOSIS — A41.9 SEPSIS, UNSPECIFIED ORGANISM: ICD-10-CM

## 2024-07-05 DIAGNOSIS — N17.0 ACUTE KIDNEY FAILURE WITH TUBULAR NECROSIS: ICD-10-CM

## 2024-07-05 LAB
ANION GAP SERPL CALC-SCNC: 7 MMOL/L — SIGNIFICANT CHANGE UP (ref 5–17)
BUN SERPL-MCNC: 61 MG/DL — HIGH (ref 7–23)
CALCIUM SERPL-MCNC: 9.4 MG/DL — SIGNIFICANT CHANGE UP (ref 8.5–10.1)
CHLORIDE SERPL-SCNC: 113 MMOL/L — HIGH (ref 96–108)
CO2 SERPL-SCNC: 21 MMOL/L — LOW (ref 22–31)
CREAT SERPL-MCNC: 2.03 MG/DL — HIGH (ref 0.5–1.3)
EGFR: 31 ML/MIN/1.73M2 — LOW
GLUCOSE BLDC GLUCOMTR-MCNC: 201 MG/DL — HIGH (ref 70–99)
GLUCOSE BLDC GLUCOMTR-MCNC: 257 MG/DL — HIGH (ref 70–99)
GLUCOSE SERPL-MCNC: 250 MG/DL — HIGH (ref 70–99)
HCT VFR BLD CALC: 28.6 % — LOW (ref 39–50)
HGB BLD-MCNC: 9.4 G/DL — LOW (ref 13–17)
MCHC RBC-ENTMCNC: 28.4 PG — SIGNIFICANT CHANGE UP (ref 27–34)
MCHC RBC-ENTMCNC: 32.9 GM/DL — SIGNIFICANT CHANGE UP (ref 32–36)
MCV RBC AUTO: 86.4 FL — SIGNIFICANT CHANGE UP (ref 80–100)
PLATELET # BLD AUTO: 239 K/UL — SIGNIFICANT CHANGE UP (ref 150–400)
POTASSIUM SERPL-MCNC: 4 MMOL/L — SIGNIFICANT CHANGE UP (ref 3.5–5.3)
POTASSIUM SERPL-SCNC: 4 MMOL/L — SIGNIFICANT CHANGE UP (ref 3.5–5.3)
RBC # BLD: 3.31 M/UL — LOW (ref 4.2–5.8)
RBC # FLD: 15.9 % — HIGH (ref 10.3–14.5)
SODIUM SERPL-SCNC: 141 MMOL/L — SIGNIFICANT CHANGE UP (ref 135–145)
WBC # BLD: 23.13 K/UL — HIGH (ref 3.8–10.5)
WBC # FLD AUTO: 23.13 K/UL — HIGH (ref 3.8–10.5)

## 2024-07-05 PROCEDURE — 99223 1ST HOSP IP/OBS HIGH 75: CPT

## 2024-07-05 RX ORDER — DEXTROSE 30 % IN WATER 30 %
25 VIAL (ML) INTRAVENOUS ONCE
Refills: 0 | Status: DISCONTINUED | OUTPATIENT
Start: 2024-07-05 | End: 2024-07-10

## 2024-07-05 RX ORDER — SODIUM CHLORIDE 0.9 % (FLUSH) 0.9 %
1000 SYRINGE (ML) INJECTION
Refills: 0 | Status: DISCONTINUED | OUTPATIENT
Start: 2024-07-05 | End: 2024-07-07

## 2024-07-05 RX ORDER — HYDROMORPHONE HCL 0.2 MG/ML
0.2 INJECTION, SOLUTION INTRAVENOUS
Refills: 0 | Status: DISCONTINUED | OUTPATIENT
Start: 2024-07-05 | End: 2024-07-10

## 2024-07-05 RX ORDER — DEXTROSE MONOHYDRATE AND SODIUM CHLORIDE 5; .3 G/100ML; G/100ML
1000 INJECTION, SOLUTION INTRAVENOUS
Refills: 0 | Status: DISCONTINUED | OUTPATIENT
Start: 2024-07-05 | End: 2024-07-10

## 2024-07-05 RX ORDER — GLUCAGON HYDROCHLORIDE 1 MG/ML
1 INJECTION, POWDER, FOR SOLUTION INTRAMUSCULAR; INTRAVENOUS; SUBCUTANEOUS ONCE
Refills: 0 | Status: DISCONTINUED | OUTPATIENT
Start: 2024-07-05 | End: 2024-07-10

## 2024-07-05 RX ORDER — DEXTROSE MONOHYDRATE 100 MG/ML
125 INJECTION, SOLUTION INTRAVENOUS ONCE
Refills: 0 | Status: DISCONTINUED | OUTPATIENT
Start: 2024-07-05 | End: 2024-07-10

## 2024-07-05 RX ORDER — DEXTROSE 30 % IN WATER 30 %
15 VIAL (ML) INTRAVENOUS ONCE
Refills: 0 | Status: DISCONTINUED | OUTPATIENT
Start: 2024-07-05 | End: 2024-07-10

## 2024-07-05 RX ORDER — ONDANSETRON HYDROCHLORIDE 2 MG/ML
4 INJECTION INTRAMUSCULAR; INTRAVENOUS EVERY 8 HOURS
Refills: 0 | Status: DISCONTINUED | OUTPATIENT
Start: 2024-07-05 | End: 2024-07-10

## 2024-07-05 RX ORDER — LORAZEPAM 0.5 MG
0.5 TABLET ORAL EVERY 6 HOURS
Refills: 0 | Status: DISCONTINUED | OUTPATIENT
Start: 2024-07-05 | End: 2024-07-10

## 2024-07-05 RX ORDER — DEXTROSE 30 % IN WATER 30 %
12.5 VIAL (ML) INTRAVENOUS ONCE
Refills: 0 | Status: DISCONTINUED | OUTPATIENT
Start: 2024-07-05 | End: 2024-07-10

## 2024-07-05 RX ORDER — MAGNESIUM, ALUMINUM HYDROXIDE 400-400
30 TABLET,CHEWABLE ORAL EVERY 4 HOURS
Refills: 0 | Status: DISCONTINUED | OUTPATIENT
Start: 2024-07-05 | End: 2024-07-10

## 2024-07-05 RX ORDER — INSULIN LISPRO 100 [IU]/ML
INJECTION, SOLUTION SUBCUTANEOUS EVERY 6 HOURS
Refills: 0 | Status: DISCONTINUED | OUTPATIENT
Start: 2024-07-05 | End: 2024-07-09

## 2024-07-05 RX ORDER — ACETAMINOPHEN 325 MG
650 TABLET ORAL EVERY 6 HOURS
Refills: 0 | Status: DISCONTINUED | OUTPATIENT
Start: 2024-07-05 | End: 2024-07-10

## 2024-07-05 RX ORDER — PIPERACILLIN SODIUM AND TAZOBACTAM SODIUM 3; .375 G/15ML; G/15ML
3.38 INJECTION, POWDER, LYOPHILIZED, FOR SOLUTION INTRAVENOUS EVERY 8 HOURS
Refills: 0 | Status: DISCONTINUED | OUTPATIENT
Start: 2024-07-05 | End: 2024-07-10

## 2024-07-05 RX ADMIN — INSULIN LISPRO 3: 100 INJECTION, SOLUTION SUBCUTANEOUS at 23:02

## 2024-07-05 RX ADMIN — Medication 125 MILLILITER(S): at 16:43

## 2024-07-05 RX ADMIN — PIPERACILLIN SODIUM AND TAZOBACTAM SODIUM 25 GRAM(S): 3; .375 INJECTION, POWDER, LYOPHILIZED, FOR SOLUTION INTRAVENOUS at 22:13

## 2024-07-05 RX ADMIN — Medication 125 MILLILITER(S): at 02:22

## 2024-07-05 RX ADMIN — PIPERACILLIN SODIUM AND TAZOBACTAM SODIUM 25 GRAM(S): 3; .375 INJECTION, POWDER, LYOPHILIZED, FOR SOLUTION INTRAVENOUS at 15:46

## 2024-07-05 RX ADMIN — PIPERACILLIN SODIUM AND TAZOBACTAM SODIUM 25 GRAM(S): 3; .375 INJECTION, POWDER, LYOPHILIZED, FOR SOLUTION INTRAVENOUS at 06:22

## 2024-07-05 NOTE — CONSULT NOTE ADULT - CONVERSATION DETAILS
The role of Palliative medicine was reviewed with the pt's spouse and son. This past Monday he had a consultation regarding his new diagnosis of Pancreatic cancer in [facility]. This week he has declined rapidly with minimal PO intake and increasing weakness prompting adm to the hospital last PM/. They are aware that he has extensive mets to the liver and lungs and would like to focus on comfort. They are also aware of perf viscous and are in agreement to cont abx for 24-48 hrs to see if he shows any improvement. At this time he is minimally rousable with intermittent restlessness and intermittent abd pain. We discussed hospice care home vs inpatient and they would like to go to see facilities prior to a decision. Will follow

## 2024-07-05 NOTE — H&P ADULT - NSHPLABSRESULTS_GEN_ALL_CORE
CBC Full  -  ( 2024 17:14 )  WBC Count : 21.77 K/uL  RBC Count : 3.46 M/uL  Hemoglobin : 9.7 g/dL  Hematocrit : 29.6 %  Platelet Count - Automated : 238 K/uL  Mean Cell Volume : 85.5 fl  Mean Cell Hemoglobin : 28.0 pg  Mean Cell Hemoglobin Concentration : 32.8 gm/dL  Auto Neutrophil # : 18.93 K/uL  Auto Lymphocyte # : 1.09 K/uL  Auto Monocyte # : 1.18 K/uL  Auto Eosinophil # : 0.17 K/uL  Auto Basophil # : 0.06 K/uL  Auto Neutrophil % : 86.9 %  Auto Lymphocyte % : 5.0 %  Auto Monocyte % : 5.4 %  Auto Eosinophil % : 0.8 %  Auto Basophil % : 0.3 %      Urinalysis Basic - ( 2024 20:37 )    Color: Yellow / Appearance: Clear / S.017 / pH: x  Gluc: x / Ketone: Trace mg/dL  / Bili: Small / Urobili: 1.0 mg/dL   Blood: x / Protein: 30 mg/dL / Nitrite: Negative   Leuk Esterase: Trace / RBC: 0 /HPF / WBC 20 /HPF   Sq Epi: x / Non Sq Epi: x / Bacteria: Moderate /HPF      138  |  106  |  68<H>  ----------------------------<  307<H>  4.2   |  24  |  2.36<H>    Ca    10.3<H>      2024 17:14  Phos  2.3     07-  Mg     2.2     07-    TPro  6.5  /  Alb  2.4<L>  /  TBili  7.0<H>  /  DBili  x   /  AST  90<H>  /  ALT  37  /  AlkPhos  469<H>

## 2024-07-05 NOTE — CONSULT NOTE ADULT - SUBJECTIVE AND OBJECTIVE BOX
HPI:    87 yo male with a recent diagnosis of stage 4 metastatic pancreatic cancer presents to the ED due to failure ot thrive. He was accompanied by his son and wife who reports that for the last 4 days, his appetite had been decreasing until today when he had been excessively lethargic which prompted the ED visit. Before this he had been complaining of heart burn for approx 1 month which prompted the GI workup and he was found to have a pancreatic cancer. He had a EGD done last week whcih also showed a duodenal ulcer with no stigmata of bleeding. He denies any nausea, vomiting, fever, chills and has been having normal bowel function.         PAST MEDICAL & SURGICAL HISTORY:  Type 2 diabetes mellitus      Hypertension      Hyperlipidemia      Pancreatic cancer          MEDICATIONS  (STANDING):  sodium chloride 0.9%. 1000 milliLiter(s) (75 mL/Hr) IV Continuous <Continuous>    MEDICATIONS  (PRN):  acetaminophen     Tablet .. 650 milliGRAM(s) Oral every 6 hours PRN Temp greater or equal to 38C (100.4F), Mild Pain (1 - 3), Moderate Pain (4 - 6)  acetaminophen     Tablet .. 650 milliGRAM(s) Oral every 6 hours PRN Temp greater or equal to 38C (100.4F), Mild Pain (1 - 3)  aluminum hydroxide/magnesium hydroxide/simethicone Suspension 30 milliLiter(s) Oral every 4 hours PRN Dyspepsia  melatonin 3 milliGRAM(s) Oral at bedtime PRN Insomnia  morphine  - Injectable 2 milliGRAM(s) IV Push every 4 hours PRN Severe Pain (7 - 10)  ondansetron Injectable 4 milliGRAM(s) IV Push every 8 hours PRN Nausea and/or Vomiting      Allergies    No Known Allergies    Intolerances        SOCIAL HISTORY:    FAMILY HISTORY:  No pertinent family history in first degree relatives            Physical Exam:  General: NAD, resting comfortably  HEENT: NC/AT, EOMI, normal hearing, no oral lesions, no LAD, neck supple  Pulmonary: normal resp effort, O2 via NC  Cardiovascular: NSR   Abdominal: soft, distended, nontender  Extremities: joints and limb grossly normal  Neuro: A/O x 1, GCS 14      Vital Signs Last 24 Hrs  T(C): 37.6 (2024 20:46), Max: 37.6 (2024 20:46)  T(F): 99.6 (2024 20:46), Max: 99.6 (2024 20:46)  HR: 74 (2024 20:46) (74 - 77)  BP: 138/106 (2024 20:46) (138/106 - 150/60)  BP(mean): --  RR: 23 (2024 20:46) (16 - 23)  SpO2: 99% (2024 20:46) (97% - 99%)    Parameters below as of 2024 20:46  Patient On (Oxygen Delivery Method): room air        I&O's Summary    2024 07:01  -  2024 00:47  --------------------------------------------------------  IN: 0 mL / OUT: 500 mL / NET: -500 mL            LABS:                        9.7    21.77 )-----------( 238      ( 2024 17:14 )             29.6     07-04    138  |  106  |  68<H>  ----------------------------<  307<H>  4.2   |  24  |  2.36<H>    Ca    10.3<H>      2024 17:14  Phos  2.3     07-04  Mg     2.2     07-04    TPro  6.5  /  Alb  2.4<L>  /  TBili  7.0<H>  /  DBili  x   /  AST  90<H>  /  ALT  37  /  AlkPhos  469<H>  07-04      Urinalysis Basic - ( 2024 20:37 )    Color: Yellow / Appearance: Clear / S.017 / pH: x  Gluc: x / Ketone: Trace mg/dL  / Bili: Small / Urobili: 1.0 mg/dL   Blood: x / Protein: 30 mg/dL / Nitrite: Negative   Leuk Esterase: Trace / RBC: 0 /HPF / WBC 20 /HPF   Sq Epi: x / Non Sq Epi: x / Bacteria: Moderate /HPF        LIVER FUNCTIONS - ( 2024 17:14 )  Alb: 2.4 g/dL / Pro: 6.5 gm/dL / ALK PHOS: 469 U/L / ALT: 37 U/L / AST: 90 U/L / GGT: x               RADIOLOGY & ADDITIONAL STUDIES:    CT Abdomen and Pelvis No Cont (24 @ 19:49)  IMPRESSION:    Gas and fluid collection between the pancreatic head and gastric antrum   suspicious for contained gastroduodenal perforation.    7 cm masslike enlargement of the pancreatic head.    Extensive pulmonary and hepatic metastasis.              
  HPI: Pt is a 88y old Male with a PMHx of DM2, HTN, HLD, recently diagnosed stage 4 pancreatic cancer presents for failure to thrive. Pt with increasing fatigue and decreased PO intake over the last few days. No reported fever, vomiting, diarrhea, pain. Palliative Medicine Consult to further establish GOC.     7/5/24 Seen and examined at bedside. Pt minimally awake. Nods no to pain or dyspnea      PAIN: ( )Yes   (X )No    DYSPNEA: ( ) Yes  ( X) No    PAST MEDICAL & SURGICAL HISTORY:  Type 2 diabetes mellitus  Hypertension  Hyperlipidemia  Pancreatic cancer    SOCIAL HX:  Lives with wife  Hx opiate tolerance ( )YES  (X )NO    Baseline ADLs  (Prior to Admission)  (X ) Independent   ( )Dependent    FAMILY HISTORY:  Unable to obtain due to AMS        Review of Systems:  Unable to obtain/Limited due to: AMS      PHYSICAL EXAM:    Vital Signs Last 24 Hrs  T(C): 37.1 (05 Jul 2024 08:33), Max: 37.6 (04 Jul 2024 20:46)  T(F): 98.7 (05 Jul 2024 08:33), Max: 99.6 (04 Jul 2024 20:46)  HR: 88 (05 Jul 2024 08:33) (73 - 88)  BP: 126/56 (05 Jul 2024 08:33) (123/64 - 171/73)  BP(mean): 68 (05 Jul 2024 08:33) (68 - 102)  RR: 23 (05 Jul 2024 08:33) (16 - 28)  SpO2: 100% (05 Jul 2024 08:33) (96% - 100%)    Parameters below as of 05 Jul 2024 08:33  Patient On (Oxygen Delivery Method): room air    PPSV2: 10-20  %    General: Elderly male in bed in mild distress  Mental Status: Lethargic  HEENT: oral mucosa dry  Lungs: clear to auscultation yandy  Cardiac: S1S2+  GI: abd soft NT ND + BS  : voids  Ext: moves on bed  Neuro: lethargy      LABS:                        9.4    23.13 )-----------( 239      ( 05 Jul 2024 05:57 )             28.6     07-05    141  |  113<H>  |  61<H>  ----------------------------<  250<H>  4.0   |  21<L>  |  2.03<H>    Ca    9.4      05 Jul 2024 05:57  Phos  2.3     07-04  Mg     2.2     07-04    TPro  6.5  /  Alb  2.4<L>  /  TBili  7.0<H>  /  DBili  x   /  AST  90<H>  /  ALT  37  /  AlkPhos  469<H>  07-04      Allergies    No Known Allergies    Intolerances      MEDICATIONS  (STANDING):  piperacillin/tazobactam IVPB.. 3.375 Gram(s) IV Intermittent every 8 hours  sodium chloride 0.9%. 1000 milliLiter(s) (75 mL/Hr) IV Continuous <Continuous>  sodium chloride 0.9%. 1000 milliLiter(s) (125 mL/Hr) IV Continuous <Continuous>    MEDICATIONS  (PRN):  acetaminophen     Tablet .. 650 milliGRAM(s) Oral every 6 hours PRN Temp greater or equal to 38C (100.4F), Mild Pain (1 - 3), Moderate Pain (4 - 6)  acetaminophen     Tablet .. 650 milliGRAM(s) Oral every 6 hours PRN Temp greater or equal to 38C (100.4F), Mild Pain (1 - 3)  aluminum hydroxide/magnesium hydroxide/simethicone Suspension 30 milliLiter(s) Oral every 4 hours PRN Dyspepsia  melatonin 3 milliGRAM(s) Oral at bedtime PRN Insomnia  morphine  - Injectable 2 milliGRAM(s) IV Push every 4 hours PRN Severe Pain (7 - 10)  ondansetron Injectable 4 milliGRAM(s) IV Push every 8 hours PRN Nausea and/or Vomiting      RADIOLOGY/ADDITIONAL STUDIES:    < from: CT Abdomen and Pelvis No Cont (07.04.24 @ 19:49) >    ACC: [id number] EXAM:  CT ABDOMEN AND PELVIS   ORDERED BY: MARYURI CARDONA     ACC: [id number] EXAM:  CT CHEST   ORDERED BY: MARYURI CARDONA     PROCEDURE DATE:  07/04/2024          INTERPRETATION:  CLINICAL INFORMATION: Leukocytosis. Metastatic   pancreaticcancer.    COMPARISON: 2 2/8/2021    CONTRAST/COMPLICATIONS:  IV Contrast: NONE  Oral Contrast: NONE  Complications: None reported at time of study completion    PROCEDURE:  CT of the Chest, Abdomen and Pelvis was performed.  Sagittal and coronal reformats were performed.    FINDINGS: Limited study without contrast.    CHEST:  LUNGS AND LARGE AIRWAYS: Patent central airways. Numerous bilateral   pulmonary nodules measuring up to 1.4 cm consistent with metastatic   disease.  PLEURA: No pleural effusion.  VESSELS: No thoracic aortic aneurysm..  HEART: Heart size is normal. No pericardial effusion.  MEDIASTINUM AND LONNIE: No lymphadenopathy.  CHEST WALL AND LOWER NECK: Within normal limits.    ABDOMEN AND PELVIS:  LIVER: Numerous hypodense hepatic lesions throughout the liver consistent   with metastatic disease. The largest lesion measures up to 3.7 cm in   hepatic segment 8.  BILE DUCTS: Normal caliber.  GALLBLADDER: High density material in the gallbladder representing sludge   or vicarious excretion of previously given contrast.  SPLEEN: Within normal limits.  PANCREAS: 7 cm masslike enlargement pancreatic head.  ADRENALS: Within normal limits.  KIDNEYS/URETERS: No hydronephrosis or nephrolithiasis. 1.6 cm right renal   cyst.  BLADDER: Within normal limits.  REPRODUCTIVE ORGANS: Prostate is not enlarged.    BOWEL: 4.5 x 2.8 cm collection of gas and fluid between the pancreatic   head and gastric antrum, suspicious for contained perforation of stomach   or duodenum. No bowel obstruction. Colonic diverticulosis.  PERITONEUM/RETROPERITONEUM: Small ascites. Mesenteric edema.  VESSELS: Atherosclerotic changes. No aneurysm.  LYMPH NODES: Upper abdominal lymphadenopathy with 1.8 x 1.2 cm portacaval   lymph node.  ABDOMINAL WALL: Small right inguinal hernia containing ascitic fluid.  BONES: Degenerative changes.    IMPRESSION:    Gas and fluid collection between the pancreatic head and gastric antrum   suspicious for contained gastroduodenal perforation.    7 cm masslike enlargement of the pancreatic head.    Extensive pulmonary and hepatic metastasis.    --- End of Report ---        < from: Xray Chest 1 View- PORTABLE-Urgent (Xray Chest 1 View- PORTABLE-Urgent .) (07.04.24 @ 17:31) >    ACC: [id number] EXAM:  XR CHEST PORTABLE URGENT 1V   ORDERED BY: MARYURI CARDONA     PROCEDURE DATE:  07/04/2024          INTERPRETATION:  Exam:XR CHEST URGENT    clinical history:fatigue    Size is stable. Patchy right basilar airspace disease. Left lung grossly   clear. No pleural effusion.    IMPRESSION: Patchy right-sided airspace disease      < end of copied text >

## 2024-07-05 NOTE — H&P ADULT - PROBLEM SELECTOR PLAN 1
CT: Gas and fluid collection between the pancreatic head and gastric antrum suspicious for contained gastroduodenal perforation. 7 cm masslike enlargement of the pancreatic head.  Extensive pulmonary and hepatic metastasis.  - sick appearing /  poor prognosis  - palliative care eval in the am

## 2024-07-05 NOTE — H&P ADULT - HISTORY OF PRESENT ILLNESS
Patient is a 87 y/o male with a PMHx of DM2, HTN, HLD, recently diagnosed stage 4 pancreatic cancer presents to the ED for failure to thrive. Pt with increasing fatigue and decreased PO intake over the last few days. No reported fever, vomiting, diarrhea, pain.

## 2024-07-05 NOTE — H&P ADULT - CONVERSATION DETAILS
Patient is sick and is not communicating with me.   LISA for reviewed which was completed by Dr. eHnry in the ED  Care discussed with Surgical resident - as surgery not offering intervention  I called patient's wife - who notes that she is exhausted, she is not willing to converse today about her  and wants to be only called in the am. I concerned about patient as patient appears to have a poor prognosis.

## 2024-07-05 NOTE — H&P ADULT - PROBLEM SELECTOR PLAN 2
- surgery at this time not offering any interventions - discussed with surgical resident  - GOC conversation -  which I was not able to have with family as wife refusing to talk at this time

## 2024-07-05 NOTE — ED ADULT NURSE REASSESSMENT NOTE - NS ED NURSE REASSESS COMMENT FT1
Pt received from Tosha PLEITEZ. Pt resting comfortably in bed at this time. Pt shows no signs of respiratory distress. Pt has fluids and medication going at this time through the IV. Side rails up and call bell light within reach.

## 2024-07-05 NOTE — PROGRESS NOTE ADULT - SUBJECTIVE AND OBJECTIVE BOX
no overnight events. had a moment of AMS this am. called by nursing. by the time i got to him he was alert. denied any pain.     Physical Exam  T(C): 36.5 (07-05-24 @ 11:14), Max: 37.6 (07-04-24 @ 20:46)  HR: 81 (07-05-24 @ 11:14) (69 - 88)  BP: 178/65 (07-05-24 @ 11:14) (123/64 - 178/65)  RR: 23 (07-05-24 @ 11:25) (16 - 30)  SpO2: 97% (07-05-24 @ 11:14) (94% - 100%)  General Appearance nad, no head trauma.   EYES no scleral icterus.   CHEST clear to auscultation bilaterally, no wheezes/rhonchi/rales.   HEART reg rate, normal S1 S2, no murmurs, clicks or rubs.   BACK no CVA , no spinal tenderness.   ABDOMEN soft NT/ND, BS present.   EXTREMITIES no clubbing, no cyanosis, no edema.   Skin normal skin, limited exam.   Psych normal affect.     Pertinent Labs/Imaging:  wbc 23.13  hgb 9.4  creat 2.03  lactic acid 2.4

## 2024-07-05 NOTE — CONSULT NOTE ADULT - ASSESSMENT
HPI: Pt is a 88y old Male with a PMHx of DM2, HTN, HLD, recently diagnosed stage 4 pancreatic cancer presents for failure to thrive. Pt with increasing fatigue and decreased PO intake over the last few days. No reported fever, vomiting, diarrhea, pain. Palliative Medicine Consult to further establish GOC.     7/5/24 Seen and examined at bedside. Pt minimally awake. Nods no to pain or dyspnea    Assessment and Plan:    1) Adenocarcinoma of pancreas  -Extensive pulmonary and hepatic metastasis.  -CT abd= Gas and fluid collection between the pancreatic head and gastric antrum suspicious for contained gastroduodenal perforation. 7 cm masslike enlargement of the pancreatic head.  Extensive pulmonary and hepatic metastasis.  -Surgical eval noted  -No surg intervention      2) Perforated viscus.   -surgery at this time not offering any interventions   -Supportive care    3) Sepsis  -Trend lactate  - perforated viscus / intraabdominal catastrophe  -Non surgical candidate  -Hydration  -Abx as per med    4) Advanced Directives  -Pt without capacity  -Wife Pau named HCP  -MOLST DNR/DNI/Trial NIV/IV/Abx/NoHD  -Called to schedule GOC  -She will call when in hospital    Time Spent: 75 minutes including the care, coordination and counseling of this patient, 50% of which was spent coordinating and counseling.         HPI: Pt is a 88y old Male with a PMHx of DM2, HTN, HLD, recently diagnosed stage 4 pancreatic cancer presents for failure to thrive. Pt with increasing fatigue and decreased PO intake over the last few days. No reported fever, vomiting, diarrhea, pain. Palliative Medicine Consult to further establish GOC.     7/5/24 Seen and examined at bedside. Pt minimally awake. Nods no to pain or dyspnea    Assessment and Plan:    1) Adenocarcinoma of pancreas  -Extensive pulmonary and hepatic metastasis.  -CT abd= Gas and fluid collection between the pancreatic head and gastric antrum suspicious for contained gastroduodenal perforation. 7 cm masslike enlargement of the pancreatic head.  Extensive pulmonary and hepatic metastasis.  -Surgical eval noted  -No surg intervention      2) Perforated viscus.   -surgery at this time not offering any interventions   -Supportive care  -Add Ativan 0.5 mg IVP Q6H PRN agitation  -Add Dilaudid 0.2 mg IVP Q3H PRN pain/dyspnea      3) Sepsis  -Trend lactate  - perforated viscus / intraabdominal catastrophe  -Non surgical candidate  -Hydration  -Abx as per med    4) Advanced Directives  -Pt without capacity  -Wife Pau named HCP  -MOLST DNR/DNI/Trial NIV/IV/Abx/NoHD  -GOC done    Time Spent: 75 minutes including the care, coordination and counseling of this patient, 50% of which was spent coordinating and counseling.

## 2024-07-05 NOTE — H&P ADULT - NSHPPHYSICALEXAM_GEN_ALL_CORE
Physical Exam:   GENERAL APPEARANCE:  NAD, hemodynamically stable  T(C): 37.6 (07-04-24 @ 20:46), Max: 37.6 (07-04-24 @ 20:46)  HR: 74 (07-04-24 @ 20:46) (74 - 77)  BP: 138/106 (07-04-24 @ 20:46) (138/106 - 150/60)  RR: 23 (07-04-24 @ 20:46) (16 - 23)  SpO2: 99% (07-04-24 @ 20:46) (97% - 99%)  HEENT:  Head is normocephalic    Skin:  Warm and dry without any rash   NECK:  Supple without lymphadenopathy.   HEART:  Regular rate and rhythm. normal S1 and S2, No M/R/G  LUNGS:  Good ins/exp effort, no W/R/R/C  ABDOMEN:  Soft, nontender, nondistended with good bowel sounds heard  EXTREMITIES:  Without cyanosis, clubbing or edema.   NEUROLOGICAL:  Gross nonfocal

## 2024-07-06 LAB
ALBUMIN SERPL ELPH-MCNC: 1.9 G/DL — LOW (ref 3.3–5)
ALP SERPL-CCNC: 349 U/L — HIGH (ref 40–120)
ALT FLD-CCNC: 32 U/L — SIGNIFICANT CHANGE UP (ref 12–78)
ANION GAP SERPL CALC-SCNC: 7 MMOL/L — SIGNIFICANT CHANGE UP (ref 5–17)
AST SERPL-CCNC: 82 U/L — HIGH (ref 15–37)
BILIRUB SERPL-MCNC: 6.9 MG/DL — HIGH (ref 0.2–1.2)
BUN SERPL-MCNC: 53 MG/DL — HIGH (ref 7–23)
CALCIUM SERPL-MCNC: 9.4 MG/DL — SIGNIFICANT CHANGE UP (ref 8.5–10.1)
CHLORIDE SERPL-SCNC: 119 MMOL/L — HIGH (ref 96–108)
CO2 SERPL-SCNC: 21 MMOL/L — LOW (ref 22–31)
CREAT SERPL-MCNC: 1.84 MG/DL — HIGH (ref 0.5–1.3)
CULTURE RESULTS: NO GROWTH — SIGNIFICANT CHANGE UP
EGFR: 35 ML/MIN/1.73M2 — LOW
GLUCOSE BLDC GLUCOMTR-MCNC: 138 MG/DL — HIGH (ref 70–99)
GLUCOSE BLDC GLUCOMTR-MCNC: 154 MG/DL — HIGH (ref 70–99)
GLUCOSE BLDC GLUCOMTR-MCNC: 155 MG/DL — HIGH (ref 70–99)
GLUCOSE BLDC GLUCOMTR-MCNC: 165 MG/DL — HIGH (ref 70–99)
GLUCOSE SERPL-MCNC: 163 MG/DL — HIGH (ref 70–99)
HCT VFR BLD CALC: 28.5 % — LOW (ref 39–50)
HGB BLD-MCNC: 9.1 G/DL — LOW (ref 13–17)
LACTATE SERPL-SCNC: 1.9 MMOL/L — SIGNIFICANT CHANGE UP (ref 0.7–2)
MAGNESIUM SERPL-MCNC: 2.2 MG/DL — SIGNIFICANT CHANGE UP (ref 1.6–2.6)
MCHC RBC-ENTMCNC: 27.7 PG — SIGNIFICANT CHANGE UP (ref 27–34)
MCHC RBC-ENTMCNC: 31.9 GM/DL — LOW (ref 32–36)
MCV RBC AUTO: 86.6 FL — SIGNIFICANT CHANGE UP (ref 80–100)
PLATELET # BLD AUTO: 184 K/UL — SIGNIFICANT CHANGE UP (ref 150–400)
POTASSIUM SERPL-MCNC: 3.7 MMOL/L — SIGNIFICANT CHANGE UP (ref 3.5–5.3)
POTASSIUM SERPL-SCNC: 3.7 MMOL/L — SIGNIFICANT CHANGE UP (ref 3.5–5.3)
PROT SERPL-MCNC: 5.1 GM/DL — LOW (ref 6–8.3)
RBC # BLD: 3.29 M/UL — LOW (ref 4.2–5.8)
RBC # FLD: 16.9 % — HIGH (ref 10.3–14.5)
SODIUM SERPL-SCNC: 147 MMOL/L — HIGH (ref 135–145)
SPECIMEN SOURCE: SIGNIFICANT CHANGE UP
WBC # BLD: 22.59 K/UL — HIGH (ref 3.8–10.5)
WBC # FLD AUTO: 22.59 K/UL — HIGH (ref 3.8–10.5)

## 2024-07-06 PROCEDURE — 99233 SBSQ HOSP IP/OBS HIGH 50: CPT

## 2024-07-06 RX ADMIN — Medication 125 MILLILITER(S): at 21:27

## 2024-07-06 RX ADMIN — PIPERACILLIN SODIUM AND TAZOBACTAM SODIUM 25 GRAM(S): 3; .375 INJECTION, POWDER, LYOPHILIZED, FOR SOLUTION INTRAVENOUS at 05:42

## 2024-07-06 RX ADMIN — Medication 125 MILLILITER(S): at 14:29

## 2024-07-06 RX ADMIN — INSULIN LISPRO 1: 100 INJECTION, SOLUTION SUBCUTANEOUS at 05:42

## 2024-07-06 RX ADMIN — Medication 125 MILLILITER(S): at 05:42

## 2024-07-06 RX ADMIN — PIPERACILLIN SODIUM AND TAZOBACTAM SODIUM 25 GRAM(S): 3; .375 INJECTION, POWDER, LYOPHILIZED, FOR SOLUTION INTRAVENOUS at 14:29

## 2024-07-06 RX ADMIN — INSULIN LISPRO 1: 100 INJECTION, SOLUTION SUBCUTANEOUS at 12:37

## 2024-07-06 RX ADMIN — PIPERACILLIN SODIUM AND TAZOBACTAM SODIUM 25 GRAM(S): 3; .375 INJECTION, POWDER, LYOPHILIZED, FOR SOLUTION INTRAVENOUS at 21:28

## 2024-07-06 NOTE — DIETITIAN INITIAL EVALUATION ADULT - OTHER INFO
Patient is a 87 y/o male with a PMHx of DM2, HTN, HLD, recently diagnosed stage 4 pancreatic cancer presents to the ED for failure to thrive. Pt with increasing fatigue and decreased PO intake over the last few days. No reported fever, vomiting, diarrhea, pain.    Admit dx is perforated viscus, pancreatic Cancer  RD bedscale wt is 88 kg  194#  Pt reports he has not been eating in past few days, and   he does "not eat much here" at   NFPE reveals muscle wasting, fat wasting, moderate  SLP examined pt on this visit  reports that pt can feed self  Recommends soft, bite sized texture, moderately thick liquids  Suggest Ensure plus tid, mod thick  POCT elevated, not on home meds for DM  Suggest check HgbA1c  Na 174H  Palliative has met with pt, possibility of inpatient hospice  Confirm Goals of Care regarding nutrition support. Will provide nutrition/ hydration within goals of care.   Recommendations to follow in Plan/Intervention    Patient is a 87 y/o male with a PMHx of DM2, HTN, HLD, recently diagnosed stage 4 pancreatic cancer presents to the ED for failure to thrive. Pt with increasing fatigue and decreased PO intake over the last few days. No reported fever, vomiting, diarrhea, pain.    Admit dx is perforated viscus, pancreatic Cancer  RD bedscale wt is 88 kg  194#  Pt reports he has not been eating in past few days, and   he does "not eat much here" at   NFPE reveals muscle wasting, fat wasting, moderate  SLP examined pt on this vis  POCT elevated, not on home meds for DM  Suggest check HgbA1c  Na 174H  Palliative has met with pt, possibility of inpatient hospice  Confirm Goals of Care regarding nutrition support. Will provide nutrition/ hydration within goals of care.   Recommendations to follow in Plan/Intervention

## 2024-07-06 NOTE — DIETITIAN INITIAL EVALUATION ADULT - PERTINENT LABORATORY DATA
07-06    147<H>  |  119<H>  |  53<H>  ----------------------------<  163<H>  3.7   |  21<L>  |  1.84<H>    Ca    9.4      06 Jul 2024 06:36  Phos  2.3     07-04  Mg     2.2     07-06    TPro  5.1<L>  /  Alb  1.9<L>  /  TBili  6.9<H>  /  DBili  x   /  AST  82<H>  /  ALT  32  /  AlkPhos  349<H>  07-06  POCT Blood Glucose.: 165 mg/dL (07-06-24 @ 05:39)

## 2024-07-06 NOTE — PATIENT PROFILE ADULT - FALL HARM RISK - HARM RISK INTERVENTIONS

## 2024-07-06 NOTE — DIETITIAN INITIAL EVALUATION ADULT - NAME AND PHONE
Mala York RDN, CDN, [facility]      [phone]   [email or web address]

## 2024-07-06 NOTE — DIETITIAN INITIAL EVALUATION ADULT - PERTINENT MEDS FT
MEDICATIONS  (STANDING):  dextrose 10% Bolus 125 milliLiter(s) IV Bolus once  dextrose 5%. 1000 milliLiter(s) (100 mL/Hr) IV Continuous <Continuous>  dextrose 5%. 1000 milliLiter(s) (50 mL/Hr) IV Continuous <Continuous>  dextrose 50% Injectable 25 Gram(s) IV Push once  dextrose 50% Injectable 12.5 Gram(s) IV Push once  glucagon  Injectable 1 milliGRAM(s) IntraMuscular once  insulin lispro (ADMELOG) corrective regimen sliding scale   SubCutaneous every 6 hours  piperacillin/tazobactam IVPB.. 3.375 Gram(s) IV Intermittent every 8 hours  sodium chloride 0.9%. 1000 milliLiter(s) (125 mL/Hr) IV Continuous <Continuous>  sodium chloride 0.9%. 1000 milliLiter(s) (75 mL/Hr) IV Continuous <Continuous>    MEDICATIONS  (PRN):  acetaminophen     Tablet .. 650 milliGRAM(s) Oral every 6 hours PRN Temp greater or equal to 38C (100.4F), Mild Pain (1 - 3)  aluminum hydroxide/magnesium hydroxide/simethicone Suspension 30 milliLiter(s) Oral every 4 hours PRN Dyspepsia  dextrose Oral Gel 15 Gram(s) Oral once PRN Blood Glucose LESS THAN 70 milliGRAM(s)/deciliter  HYDROmorphone  Injectable 0.2 milliGRAM(s) IV Push every 3 hours PRN Moderate Pain (4 - 6) dyspnea  LORazepam   Injectable 0.5 milliGRAM(s) IV Push every 6 hours PRN Restlessness/Agitation  melatonin 3 milliGRAM(s) Oral at bedtime PRN Insomnia  morphine  - Injectable 2 milliGRAM(s) IV Push every 4 hours PRN Severe Pain (7 - 10)  ondansetron Injectable 4 milliGRAM(s) IV Push every 8 hours PRN Nausea and/or Vomiting

## 2024-07-06 NOTE — PROGRESS NOTE ADULT - SUBJECTIVE AND OBJECTIVE BOX
no overnight events. doing better today, more alert and answering questions.  feels tired, no appetite depsite not eating for a few days.  no abd pain    Physical Exam  T(C): 36.8 (07-06-24 @ 08:11), Max: 36.8 (07-06-24 @ 08:11)  HR: 76 (07-06-24 @ 08:11) (76 - 81)  BP: 151/64 (07-06-24 @ 08:11) (143/54 - 178/65)  RR: 18 (07-06-24 @ 08:11) (18 - 30)  SpO2: 95% (07-06-24 @ 08:11) (95% - 100%)  General Appearance nad, no head trauma.   EYES no scleral icterus.   CHEST clear to auscultation bilaterally, no wheezes/rhonchi/rales.   ABDOMEN soft NT/ND, BS present.   EXTREMITIES no clubbing, no cyanosis, no edema.   Skin normal skin, limited exam.   Psych normal affect.     Pertinent Labs/Imaging:  wbc 22.5  na 147  creat 1.84  tbili 6.9  lactate 1.9

## 2024-07-06 NOTE — DIETITIAN INITIAL EVALUATION ADULT - ADD RECOMMEND
Advance diet when and if medically feasible to soft and bite sized, mod thick liquids.  Add MVI w minerals  Record PO intake in EMR after each meal (nursing.)   suggest Confirm Goals of Care regarding nutrition support. Will provide nutrition/ hydration within goals of care.   continue with POCT  A target glucose range of 140-180 mg/dL is recommended for most critically ill and non-critically ill patients.   Monitor PO intake, tolerance, labs and weight.  PT is NPO  Advance diet to regular if and when medically feasible   Record PO intake in EMR after each meal (nursing.)   suggest Confirm Goals of Care regarding nutrition support. Will provide nutrition/ hydration within goals of care.   continue with POCT  A target glucose range of 140-180 mg/dL is recommended for most critically ill and non-critically ill patients.   Monitor PO intake, tolerance, labs and weight.  PT is NPO  Advance diet to regular if and when medically feasible  Ensure plus tid if diet advanced   Record PO intake in EMR after each meal (nursing.)   suggest Confirm Goals of Care regarding nutrition support. Will provide nutrition/ hydration within goals of care.   continue with POCT  A target glucose range of 140-180 mg/dL is recommended for most critically ill and non-critically ill patients.   Monitor PO intake, tolerance, labs and weight.

## 2024-07-06 NOTE — DIETITIAN INITIAL EVALUATION ADULT - DIET TYPE
Advance diet when and if medically feasible to soft and bite sized, mod thick liquids./pureed/soft and bite-sized/supplement (specify)/moderately thick liquids Advance diet when and if medically feasible to regular./1200ml/supplement (specify)/moderately thick liquids

## 2024-07-06 NOTE — DIETITIAN NUTRITION RISK NOTIFICATION - ADDITIONAL COMMENTS/DIETITIAN RECOMMENDATIONS
Advance diet when and if medically feasible to soft and bite sized, mod thick liquids.  Add MVI w minerals  Record PO intake in EMR after each meal (nursing.)   suggest Confirm Goals of Care regarding nutrition support. Will provide nutrition/ hydration within goals of care.   continue with POCT  A target glucose range of 140-180 mg/dL is recommended for most critically ill and non-critically ill patients.   Monitor PO intake, tolerance, labs and weight.  Advance diet when and if medically feasible to regular.  Add MVI w minerals  Ensure plus tid on advance of diet, if advanced.   Record PO intake in EMR after each meal (nursing.)   suggest Confirm Goals of Care regarding nutrition support. Will provide nutrition/ hydration within goals of care.   continue with POCT  A target glucose range of 140-180 mg/dL is recommended for most critically ill and non-critically ill patients.   Monitor PO intake, tolerance, labs and weight.

## 2024-07-06 NOTE — DIETITIAN INITIAL EVALUATION ADULT - ETIOLOGY
r/t inability to consume sufficient energy/protein 2/2 perforated viscus, tubular necrosis, pancreatic cancer

## 2024-07-07 LAB
ANION GAP SERPL CALC-SCNC: 9 MMOL/L — SIGNIFICANT CHANGE UP (ref 5–17)
BUN SERPL-MCNC: 50 MG/DL — HIGH (ref 7–23)
CALCIUM SERPL-MCNC: 9.5 MG/DL — SIGNIFICANT CHANGE UP (ref 8.5–10.1)
CHLORIDE SERPL-SCNC: 120 MMOL/L — HIGH (ref 96–108)
CO2 SERPL-SCNC: 20 MMOL/L — LOW (ref 22–31)
CREAT SERPL-MCNC: 2.03 MG/DL — HIGH (ref 0.5–1.3)
EGFR: 31 ML/MIN/1.73M2 — LOW
GLUCOSE BLDC GLUCOMTR-MCNC: 143 MG/DL — HIGH (ref 70–99)
GLUCOSE BLDC GLUCOMTR-MCNC: 145 MG/DL — HIGH (ref 70–99)
GLUCOSE BLDC GLUCOMTR-MCNC: 196 MG/DL — HIGH (ref 70–99)
GLUCOSE BLDC GLUCOMTR-MCNC: 201 MG/DL — HIGH (ref 70–99)
GLUCOSE BLDC GLUCOMTR-MCNC: 218 MG/DL — HIGH (ref 70–99)
GLUCOSE SERPL-MCNC: 175 MG/DL — HIGH (ref 70–99)
HCT VFR BLD CALC: 28.2 % — LOW (ref 39–50)
HGB BLD-MCNC: 8.9 G/DL — LOW (ref 13–17)
MCHC RBC-ENTMCNC: 27.9 PG — SIGNIFICANT CHANGE UP (ref 27–34)
MCHC RBC-ENTMCNC: 31.6 GM/DL — LOW (ref 32–36)
MCV RBC AUTO: 88.4 FL — SIGNIFICANT CHANGE UP (ref 80–100)
PLATELET # BLD AUTO: 251 K/UL — SIGNIFICANT CHANGE UP (ref 150–400)
POTASSIUM SERPL-MCNC: 4.5 MMOL/L — SIGNIFICANT CHANGE UP (ref 3.5–5.3)
POTASSIUM SERPL-SCNC: 4.5 MMOL/L — SIGNIFICANT CHANGE UP (ref 3.5–5.3)
RBC # BLD: 3.19 M/UL — LOW (ref 4.2–5.8)
RBC # FLD: 18.2 % — HIGH (ref 10.3–14.5)
SODIUM SERPL-SCNC: 149 MMOL/L — HIGH (ref 135–145)
WBC # BLD: 27.56 K/UL — HIGH (ref 3.8–10.5)
WBC # FLD AUTO: 27.56 K/UL — HIGH (ref 3.8–10.5)

## 2024-07-07 PROCEDURE — 99232 SBSQ HOSP IP/OBS MODERATE 35: CPT

## 2024-07-07 RX ORDER — DEXTROSE MONOHYDRATE AND SODIUM CHLORIDE 5; .3 G/100ML; G/100ML
1000 INJECTION, SOLUTION INTRAVENOUS
Refills: 0 | Status: DISCONTINUED | OUTPATIENT
Start: 2024-07-07 | End: 2024-07-10

## 2024-07-07 RX ADMIN — Medication 125 MILLILITER(S): at 05:53

## 2024-07-07 RX ADMIN — PIPERACILLIN SODIUM AND TAZOBACTAM SODIUM 25 GRAM(S): 3; .375 INJECTION, POWDER, LYOPHILIZED, FOR SOLUTION INTRAVENOUS at 21:14

## 2024-07-07 RX ADMIN — INSULIN LISPRO 2: 100 INJECTION, SOLUTION SUBCUTANEOUS at 12:36

## 2024-07-07 RX ADMIN — PIPERACILLIN SODIUM AND TAZOBACTAM SODIUM 25 GRAM(S): 3; .375 INJECTION, POWDER, LYOPHILIZED, FOR SOLUTION INTRAVENOUS at 13:15

## 2024-07-07 RX ADMIN — PIPERACILLIN SODIUM AND TAZOBACTAM SODIUM 25 GRAM(S): 3; .375 INJECTION, POWDER, LYOPHILIZED, FOR SOLUTION INTRAVENOUS at 05:52

## 2024-07-07 RX ADMIN — DEXTROSE MONOHYDRATE AND SODIUM CHLORIDE 100 MILLILITER(S): 5; .3 INJECTION, SOLUTION INTRAVENOUS at 10:40

## 2024-07-07 RX ADMIN — INSULIN LISPRO 1: 100 INJECTION, SOLUTION SUBCUTANEOUS at 18:00

## 2024-07-07 RX ADMIN — DEXTROSE MONOHYDRATE AND SODIUM CHLORIDE 100 MILLILITER(S): 5; .3 INJECTION, SOLUTION INTRAVENOUS at 21:25

## 2024-07-07 RX ADMIN — MORPHINE SULFATE 2 MILLIGRAM(S): 100 TABLET, EXTENDED RELEASE ORAL at 17:08

## 2024-07-07 NOTE — PROGRESS NOTE ADULT - SUBJECTIVE AND OBJECTIVE BOX
no overnight events. no appetite. no abd pain    Physical Exam  T(C): 36.6 (07-07-24 @ 09:09), Max: 36.9 (07-06-24 @ 21:15)  HR: 75 (07-07-24 @ 09:09) (75 - 81)  BP: 141/52 (07-07-24 @ 09:09) (141/52 - 148/61)  RR: 17 (07-07-24 @ 09:09) (17 - 22)  SpO2: 95% (07-07-24 @ 09:09) (95% - 96%)  General Appearance nad, no head trauma.   EYES no scleral icterus.   HEART RRR, normal S1 S2, no murmurs, clicks or rubs.   ABDOMEN soft NT/Distended, BS present.   EXTREMITIES no clubbing, no cyanosis, no edema.   Skin normal skin, limited exam.   Psych normal affect.     Pertinent Labs/Imaging:  wbc 27  hgb 8.9  na 149 gluc 175

## 2024-07-08 LAB
ANION GAP SERPL CALC-SCNC: 10 MMOL/L — SIGNIFICANT CHANGE UP (ref 5–17)
BUN SERPL-MCNC: 50 MG/DL — HIGH (ref 7–23)
CALCIUM SERPL-MCNC: 9.6 MG/DL — SIGNIFICANT CHANGE UP (ref 8.5–10.1)
CHLORIDE SERPL-SCNC: 116 MMOL/L — HIGH (ref 96–108)
CO2 SERPL-SCNC: 18 MMOL/L — LOW (ref 22–31)
CREAT SERPL-MCNC: 2.11 MG/DL — HIGH (ref 0.5–1.3)
EGFR: 30 ML/MIN/1.73M2 — LOW
GLUCOSE BLDC GLUCOMTR-MCNC: 212 MG/DL — HIGH (ref 70–99)
GLUCOSE BLDC GLUCOMTR-MCNC: 264 MG/DL — HIGH (ref 70–99)
GLUCOSE BLDC GLUCOMTR-MCNC: 267 MG/DL — HIGH (ref 70–99)
GLUCOSE BLDC GLUCOMTR-MCNC: 270 MG/DL — HIGH (ref 70–99)
GLUCOSE SERPL-MCNC: 236 MG/DL — HIGH (ref 70–99)
HCT VFR BLD CALC: 29.7 % — LOW (ref 39–50)
HGB BLD-MCNC: 9.3 G/DL — LOW (ref 13–17)
MCHC RBC-ENTMCNC: 28 PG — SIGNIFICANT CHANGE UP (ref 27–34)
MCHC RBC-ENTMCNC: 31.3 GM/DL — LOW (ref 32–36)
MCV RBC AUTO: 89.5 FL — SIGNIFICANT CHANGE UP (ref 80–100)
PLATELET # BLD AUTO: 223 K/UL — SIGNIFICANT CHANGE UP (ref 150–400)
POTASSIUM SERPL-MCNC: 3.9 MMOL/L — SIGNIFICANT CHANGE UP (ref 3.5–5.3)
POTASSIUM SERPL-SCNC: 3.9 MMOL/L — SIGNIFICANT CHANGE UP (ref 3.5–5.3)
RBC # BLD: 3.32 M/UL — LOW (ref 4.2–5.8)
RBC # FLD: 19 % — HIGH (ref 10.3–14.5)
SARS-COV-2 RNA SPEC QL NAA+PROBE: SIGNIFICANT CHANGE UP
SODIUM SERPL-SCNC: 144 MMOL/L — SIGNIFICANT CHANGE UP (ref 135–145)
WBC # BLD: 25.95 K/UL — HIGH (ref 3.8–10.5)
WBC # FLD AUTO: 25.95 K/UL — HIGH (ref 3.8–10.5)

## 2024-07-08 PROCEDURE — 99233 SBSQ HOSP IP/OBS HIGH 50: CPT

## 2024-07-08 RX ADMIN — INSULIN LISPRO 3: 100 INJECTION, SOLUTION SUBCUTANEOUS at 18:02

## 2024-07-08 RX ADMIN — INSULIN LISPRO 3: 100 INJECTION, SOLUTION SUBCUTANEOUS at 00:19

## 2024-07-08 RX ADMIN — PIPERACILLIN SODIUM AND TAZOBACTAM SODIUM 25 GRAM(S): 3; .375 INJECTION, POWDER, LYOPHILIZED, FOR SOLUTION INTRAVENOUS at 05:59

## 2024-07-08 RX ADMIN — INSULIN LISPRO 2: 100 INJECTION, SOLUTION SUBCUTANEOUS at 06:47

## 2024-07-08 RX ADMIN — PIPERACILLIN SODIUM AND TAZOBACTAM SODIUM 25 GRAM(S): 3; .375 INJECTION, POWDER, LYOPHILIZED, FOR SOLUTION INTRAVENOUS at 22:33

## 2024-07-08 RX ADMIN — INSULIN LISPRO 3: 100 INJECTION, SOLUTION SUBCUTANEOUS at 12:21

## 2024-07-08 RX ADMIN — PIPERACILLIN SODIUM AND TAZOBACTAM SODIUM 25 GRAM(S): 3; .375 INJECTION, POWDER, LYOPHILIZED, FOR SOLUTION INTRAVENOUS at 15:43

## 2024-07-08 NOTE — PROGRESS NOTE ADULT - SUBJECTIVE AND OBJECTIVE BOX
no overnight events. met with family-wife, son to discuss next steps.   inpatient hospice has stated he is not a candidate. he may end up going to home hopsice  pt denies appetite or pain    Physical Exam  T(C): 36.4 (07-08-24 @ 08:12), Max: 36.4 (07-07-24 @ 21:47)  HR: 75 (07-08-24 @ 08:12) (74 - 75)  BP: 123/58 (07-08-24 @ 08:12) (123/58 - 162/62)  RR: 18 (07-08-24 @ 08:12) (17 - 18)  SpO2: 96% (07-08-24 @ 08:12) (96% - 98%)  General Appearance nad, no head trauma.   EYES +scleral icterus.   ABDOMEN soft NT/ND, BS present.   EXTREMITIES no clubbing, no cyanosis, no edema.   Skin normal skin, limited exam.   Psych normal affect.     Pertinent Labs/Imaging:  wbc 25  creat 2.11

## 2024-07-09 ENCOUNTER — TRANSCRIPTION ENCOUNTER (OUTPATIENT)
Age: 89
End: 2024-07-09

## 2024-07-09 LAB
GLUCOSE BLDC GLUCOMTR-MCNC: 248 MG/DL — HIGH (ref 70–99)
GLUCOSE BLDC GLUCOMTR-MCNC: 305 MG/DL — HIGH (ref 70–99)

## 2024-07-09 PROCEDURE — 99232 SBSQ HOSP IP/OBS MODERATE 35: CPT

## 2024-07-09 RX ORDER — LORAZEPAM 0.5 MG
0.5 TABLET ORAL
Qty: 30 | Refills: 0
Start: 2024-07-09 | End: 2024-07-13

## 2024-07-09 RX ORDER — MORPHINE SULFATE 100 MG/1
0.25 TABLET, EXTENDED RELEASE ORAL
Qty: 15 | Refills: 0
Start: 2024-07-09

## 2024-07-09 RX ORDER — LORAZEPAM 0.5 MG
1 TABLET ORAL
Qty: 20 | Refills: 0
Start: 2024-07-09 | End: 2024-07-13

## 2024-07-09 RX ORDER — ACETAMINOPHEN 325 MG
1 TABLET ORAL
Qty: 20 | Refills: 0
Start: 2024-07-09 | End: 2024-07-13

## 2024-07-09 RX ORDER — PROCHLORPERAZINE MALEATE 10 MG/1
1 TABLET, FILM COATED ORAL
Qty: 10 | Refills: 0
Start: 2024-07-09

## 2024-07-09 RX ORDER — HYOSCYAMINE SULFATE 0.125 MG
1 TABLET,DISINTEGRATING ORAL
Qty: 20 | Refills: 0
Start: 2024-07-09 | End: 2024-07-13

## 2024-07-09 RX ORDER — HALOPERIDOL DECANOATE 100 MG/ML
1 VIAL (ML) INTRAMUSCULAR
Qty: 20 | Refills: 0
Start: 2024-07-09 | End: 2024-07-13

## 2024-07-09 RX ADMIN — INSULIN LISPRO 4: 100 INJECTION, SOLUTION SUBCUTANEOUS at 00:11

## 2024-07-09 RX ADMIN — PIPERACILLIN SODIUM AND TAZOBACTAM SODIUM 25 GRAM(S): 3; .375 INJECTION, POWDER, LYOPHILIZED, FOR SOLUTION INTRAVENOUS at 05:42

## 2024-07-09 RX ADMIN — PIPERACILLIN SODIUM AND TAZOBACTAM SODIUM 25 GRAM(S): 3; .375 INJECTION, POWDER, LYOPHILIZED, FOR SOLUTION INTRAVENOUS at 14:18

## 2024-07-09 RX ADMIN — INSULIN LISPRO 2: 100 INJECTION, SOLUTION SUBCUTANEOUS at 05:41

## 2024-07-09 RX ADMIN — PIPERACILLIN SODIUM AND TAZOBACTAM SODIUM 25 GRAM(S): 3; .375 INJECTION, POWDER, LYOPHILIZED, FOR SOLUTION INTRAVENOUS at 21:13

## 2024-07-09 RX ADMIN — DEXTROSE MONOHYDRATE AND SODIUM CHLORIDE 100 MILLILITER(S): 5; .3 INJECTION, SOLUTION INTRAVENOUS at 03:30

## 2024-07-09 NOTE — DISCHARGE NOTE PROVIDER - NSDCMRMEDTOKEN_GEN_ALL_CORE_FT
amlodipine-olmesartan 10 mg-40 mg oral tablet: 1 tab(s) orally once a day  carvedilol 12.5 mg oral tablet: 1 tab(s) orally 2 times a day  ferrous sulfate 325 mg (65 mg elemental iron) oral tablet: 1 tab(s) orally once a day  gabapentin 100 mg oral capsule: 1 cap(s) orally 3 times a day  Lokelma 5 g oral powder for reconstitution: 5 gram(s) orally once a day  OLANZapine 5 mg oral tablet: 1 tab(s) orally once a day  omeprazole 40 mg oral delayed release capsule: 1 cap(s) orally once a day  pioglitazone 30 mg oral tablet: 1 tab(s) orally once a day  travoprost 0.004% ophthalmic solution: 1 drop(s) to each affected eye once a day (in the evening)   acetaminophen 650 mg oral tablet, extended release: 1 tab(s) orally every 6 hours as needed for  mild pain MDD: 4 tabs  amlodipine-olmesartan 10 mg-40 mg oral tablet: 1 tab(s) orally once a day  carvedilol 12.5 mg oral tablet: 1 tab(s) orally 2 times a day  ferrous sulfate 325 mg (65 mg elemental iron) oral tablet: 1 tab(s) orally once a day  gabapentin 100 mg oral capsule: 1 cap(s) orally 3 times a day  haloperidol 1 mg oral tablet: 1 tab(s) orally every 6 hours as needed for  agitation MDD: 4 tabs  hyoscyamine 0.125 mg oral tablet: 1 tab(s) orally every 6 hours as needed for terminal/excessive secretions MDD: 4 tabs  Lokelma 5 g oral powder for reconstitution: 5 gram(s) orally once a day  LORazepam 0.5 mg oral tablet: 1 tab(s) orally every 6 hours as needed for  anxiety MDD: 4 tabs  LORazepam 2 mg/mL oral concentrate: 0.5 milliliter(s) orally every 6 hours as needed for  anxiety MDD: 2 ml  morphine 20 mg/mL oral concentrate: 0.25 milliliter(s) orally every 6 hours as needed for  shortness of breath and/or wheezing MDD: 1 ml  OLANZapine 5 mg oral tablet: 1 tab(s) orally once a day  omeprazole 40 mg oral delayed release capsule: 1 cap(s) orally once a day  pioglitazone 30 mg oral tablet: 1 tab(s) orally once a day  prochlorperazine 10 mg oral tablet: 1 tab(s) orally every 12 hours as needed for  nausea MDD: 2 tabs  travoprost 0.004% ophthalmic solution: 1 drop(s) to each affected eye once a day (in the evening)   acetaminophen 650 mg oral tablet, extended release: 1 tab(s) orally every 6 hours as needed for  mild pain MDD: 4 tabs  haloperidol 1 mg oral tablet: 1 tab(s) orally every 6 hours as needed for  agitation MDD: 4 tabs  hyoscyamine 0.125 mg oral tablet, disintegratin tab(s) orally every 6 hours as needed for secretions MDD: 4 tabs  LORazepam 0.5 mg oral tablet: 1 tab(s) orally every 6 hours as needed for  anxiety MDD: 4 tabs  LORazepam 2 mg/mL oral concentrate: 0.5 milliliter(s) orally every 6 hours as needed for  anxiety MDD: 2 ml  morphine 20 mg/mL oral concentrate: 0.25 milliliter(s) orally every 6 hours as needed for  shortness of breath and/or wheezing MDD: 1 ml  prochlorperazine 10 mg oral tablet: 1 tab(s) orally every 12 hours as needed for  nausea MDD: 2 tabs

## 2024-07-09 NOTE — PROGRESS NOTE ADULT - NUTRITIONAL ASSESSMENT
This patient has been assessed with a concern for Malnutrition and has been determined to have a diagnosis/diagnoses of Severe protein-calorie malnutrition.    This patient is being managed with:   Diet NPO-  Entered: Jul 5 2024  5:43PM  

## 2024-07-09 NOTE — CHART NOTE - NSCHARTNOTEFT_GEN_A_CORE
Spoke with son, Randy to further discuss plans.  Son shared decision to return home with [facility] home hospice.  Son states he is in the process of obtaining 24/7 aide services.  He reports equipment was delivered to the home and he contacted [facility] to discuss comfort pack.  The philosophy of hospice and the services provided at home discussed again in details.  [facility] discussed 7/5 with wife and son.  Son aware of qualifications for inpatient hospice.  Son pleased, aware of palliative team availability.  Emotional support provided.  Our team to continue to follow.

## 2024-07-09 NOTE — PROGRESS NOTE ADULT - PROBLEM SELECTOR PLAN 1
-surg involved-no surgical intervention at this time, no role for antibiotics since contained and likely will not survive if becomes uncontained. no signs of peritonitis at this time.   -palliative involved  -pain control with morphine and dilaudid  -ondansetron prn  -npo for now-small sips  -empiric zosyn

## 2024-07-09 NOTE — DISCHARGE NOTE PROVIDER - DETAILS OF MALNUTRITION DIAGNOSIS/DIAGNOSES
This patient has been assessed with a concern for Malnutrition and was treated during this hospitalization for the following Nutrition diagnosis/diagnoses:     -  07/06/2024: Severe protein-calorie malnutrition

## 2024-07-09 NOTE — DISCHARGE NOTE PROVIDER - NSDCCPCAREPLAN_GEN_ALL_CORE_FT
PRINCIPAL DISCHARGE DIAGNOSIS  Diagnosis: Perforated viscus  Assessment and Plan of Treatment: Going home on hospice      SECONDARY DISCHARGE DIAGNOSES  Diagnosis: Pancreatic cancer  Assessment and Plan of Treatment:

## 2024-07-09 NOTE — PROGRESS NOTE ADULT - SUBJECTIVE AND OBJECTIVE BOX
< from: CT Abdomen and Pelvis No Cont (07.04.24 @ 19:49) >  IMPRESSION:    Gas and fluid collection between the pancreatic head and gastric antrum   suspicious for contained gastroduodenal perforation.    7 cm masslike enlargement of the pancreatic head.    Extensive pulmonary and hepatic metastasis.    --- End of Report ---    < end of copied text >  < from: Xray Chest 1 View- PORTABLE-Urgent (Xray Chest 1 View- PORTABLE-Urgent .) (07.04.24 @ 17:31) >    IMPRESSION: Patchy right-sided airspace disease    --- End of Report ---    < end of copied text >  Culture - Urine (07.04.24 @ 20:37)   Specimen Source: .Urine None  Culture Results:   No growthCulture - Blood (07.04.24 @ 17:14)   Specimen Source: .Blood Blood-Peripheral  Culture Results:   No growth at 4 days HOSPITALIST ATTENDING PROGRESS NOTE    Chart and meds reviewed.  Patient seen and examined.    CC/ HPI Patient is a 88y old  Male who presents with a chief complaint of Sick (09 Jul 2024 11:12)      Subjective: Patient is set to go to home hospice on 7/10. Sent meds to [facility] pharmacy as son is going to pick them up today.     All other systems reviewed and found to be negative with the exception of what has been described above.    MEDICATIONS:  MEDICATIONS  (STANDING):  dextrose 10% Bolus 125 milliLiter(s) IV Bolus once  dextrose 5%. 1000 milliLiter(s) (100 mL/Hr) IV Continuous <Continuous>  dextrose 5%. 1000 milliLiter(s) (50 mL/Hr) IV Continuous <Continuous>  dextrose 5%. 1000 milliLiter(s) (100 mL/Hr) IV Continuous <Continuous>  dextrose 50% Injectable 25 Gram(s) IV Push once  dextrose 50% Injectable 12.5 Gram(s) IV Push once  glucagon  Injectable 1 milliGRAM(s) IntraMuscular once  piperacillin/tazobactam IVPB.. 3.375 Gram(s) IV Intermittent every 8 hours  sodium chloride 0.9%. 1000 milliLiter(s) (75 mL/Hr) IV Continuous <Continuous>      Vital Signs Last 24 Hrs  T(C): 36.4 (09 Jul 2024 15:31), Max: 36.4 (09 Jul 2024 08:16)  T(F): 97.5 (09 Jul 2024 15:31), Max: 97.6 (09 Jul 2024 08:16)  HR: 66 (09 Jul 2024 15:31) (66 - 78)  BP: 118/48 (09 Jul 2024 15:31) (118/48 - 139/65)  BP(mean): --  RR: 20 (09 Jul 2024 15:31) (20 - 22)  SpO2: 95% (09 Jul 2024 15:31) (95% - 97%)    Parameters below as of 09 Jul 2024 15:31  Patient On (Oxygen Delivery Method): room air        I&O's Summary    08 Jul 2024 07:01  -  09 Jul 2024 07:00  --------------------------------------------------------  IN: 0 mL / OUT: 550 mL / NET: -550 mL        CAPILLARY BLOOD GLUCOSE      POCT Blood Glucose.: 248 mg/dL (09 Jul 2024 05:39)  POCT Blood Glucose.: 305 mg/dL (09 Jul 2024 00:08)      PHYSICAL EXAM:    Constitutional: NAD  HEENT:  EOMI, scleral icterus, MMM  Neck: Soft and supple, No LAD, No JVD  Respiratory: Breath sounds are clear bilaterally, No wheezing, rales or rhonchi  Cardiovascular: S1 and S2, regular rate and rhythm, no Murmurs, gallops or rubs  Gastrointestinal: Bowel Sounds present, soft, nontender, nondistended, no guarding, no rebound  Extremities: 1+ LE pitting peripheral edema  Vascular: 2+ peripheral pulses  Neurological: no focal deficits  Musculoskeletal: 5/5 strength b/l upper and lower extremities  Skin: No rashes        LABS: All Labs Reviewed:                        9.3    25.95 )-----------( 223      ( 08 Jul 2024 06:41 )             29.7     07-08    144  |  116<H>  |  50<H>  ----------------------------<  236<H>  3.9   |  18<L>  |  2.11<H>    Ca    9.6      08 Jul 2024 06:41            Blood Culture: 07-04 @ 20:37  Organism --  Gram Stain Blood -- Gram Stain --  Specimen Source .Urine None  Culture-Blood --        RADIOLOGY/EKG:    DVT PPX:    ADVANCED DIRECTIVE:    DISPOSITION: DC planning for home hospice 7/10    Total time spent: 35   minutes     < from: CT Abdomen and Pelvis No Cont (07.04.24 @ 19:49) >  IMPRESSION:    Gas and fluid collection between the pancreatic head and gastric antrum   suspicious for contained gastroduodenal perforation.    7 cm masslike enlargement of the pancreatic head.    Extensive pulmonary and hepatic metastasis.    --- End of Report ---    < end of copied text >  < from: Xray Chest 1 View- PORTABLE-Urgent (Xray Chest 1 View- PORTABLE-Urgent .) (07.04.24 @ 17:31) >    IMPRESSION: Patchy right-sided airspace disease    --- End of Report ---    < end of copied text >  Culture - Urine (07.04.24 @ 20:37)   Specimen Source: .Urine None  Culture Results:   No growthCulture - Blood (07.04.24 @ 17:14)   Specimen Source: .Blood Blood-Peripheral  Culture Results:   No growth at 4 days

## 2024-07-09 NOTE — PROGRESS NOTE ADULT - ASSESSMENT
89 y/o male with a PMHx of DM2, HTN, HLD, recently diagnosed stage 4 pancreatic cancer presents to the ED for failure to thrive. Pt with increasing fatigue and decreased PO intake over the last few days. No reported fever, vomiting, diarrhea, pain. found to have mets to the lung and liver. also with a contained gastroduodenal perforation.     pancreatic cancer stage 4 mets to liver/lung  contained gastroduodenal perforation  -surg involved-no surgical intervention at this time, no role for antibiotics since contained and likely will not survive if becomes uncontained. no signs of peritonitis at this time.   -palliative called-dnr/dni  -pain control with morphine  -ondansetron prn    HTN  OPAL likely related to hypoperfusion  -on hold bp meds-amlodipine, olmesartan , carvedilol   -gabapentin     DVT proph:  	SCDs  Code Status: DNR/DNI  dispo: discharge pending [facility]    Outpatient follow-up:   with PMD  
87 y/o male with a PMHx of DM2, HTN, HLD, recently diagnosed stage 4 pancreatic cancer presents to the ED for failure to thrive. Pt with increasing fatigue and decreased PO intake over the last few days. No reported fever, vomiting, diarrhea, pain. found to have mets to the lung and liver. also with a contained gastroduodenal perforation.     pancreatic cancer stage 4 mets to liver/lung  contained gastroduodenal perforation  -surg involved-no surgical intervention at this time, no role for antibiotics since contained and likely will not survive if becomes uncontained. no signs of peritonitis at this time.   -palliative involved  -pain control with morphine and dilaudid  -ondansetron prn  -npo for now-small sips  -empiric zosyn    HTN  OPAL likely related to hypoperfusion  hypernatremia-change fluids to D5  -on hold bp meds-amlodipine, olmesartan , carvedilol   -gabapentin     DVT proph:  	SCDs  Code Status: DNR/DNI  dispo: discharge pending hospice eval    Outpatient follow-up:   with PMD  
87 y/o male with a PMHx of DM2, HTN, HLD, recently diagnosed stage 4 pancreatic cancer presents to the ED for failure to thrive. Pt with increasing fatigue and decreased PO intake over the last few days. No reported fever, vomiting, diarrhea, pain. found to have mets to the lung and liver. also with a contained gastroduodenal perforation.     pancreatic cancer stage 4 mets to liver/lung  contained gastroduodenal perforation  -surg involved-no surgical intervention at this time, no role for antibiotics since contained and likely will not survive if becomes uncontained. no signs of peritonitis at this time.   -palliative involved  -pain control with morphine and dilaudid  -ondansetron prn  -npo for now-small sips  -empiric zosyn    HTN  OPAL likely related to hypoperfusion  hypernatremia-change fluids to D5  -on hold bp meds-amlodipine, olmesartan , carvedilol   -gabapentin     DVT proph:  	SCDs  Code Status: DNR/DNI  dispo: discharge pending hospice eval and likely will go to home hospice    Outpatient follow-up:   with PMD  
89 y/o male with a PMHx of DM2, HTN, HLD, recently diagnosed stage 4 pancreatic cancer presents to the ED for failure to thrive. Pt with increasing fatigue and decreased PO intake over the last few days. No reported fever, vomiting, diarrhea, pain. found to have mets to the lung and liver. also with a contained gastroduodenal perforation.     pancreatic cancer stage 4 mets to liver/lung  contained gastroduodenal perforation  -surg involved-no surgical intervention at this time, no role for antibiotics since contained and likely will not survive if becomes uncontained. no signs of peritonitis at this time.   -palliative involved  -pain control with morphine and dilaudid  -ondansetron prn  -npo for now  -empiric zosyn    HTN  OPAL likely related to hypoperfusion  -on hold bp meds-amlodipine, olmesartan , carvedilol   -gabapentin     DVT proph:  	SCDs  Code Status: DNR/DNI  dispo: discharge pending hospice eval    Outpatient follow-up:   with PMD
89 y/o male with a PMHx of DM2, HTN, HLD, recently diagnosed stage 4 pancreatic cancer presents to the ED for failure to thrive. Pt with increasing fatigue and decreased PO intake over the last few days. No reported fever, vomiting, diarrhea, pain. found to have mets to the lung and liver. also with a contained gastroduodenal perforation.

## 2024-07-09 NOTE — DISCHARGE NOTE PROVIDER - ATTENDING DISCHARGE PHYSICAL EXAMINATION:
Gen jaundice  head ncat  ent perrl  neck supple  chest cta  cvs s1s2  abd soft   ext edema  skin no lesions  neuro aaox2

## 2024-07-09 NOTE — DISCHARGE NOTE PROVIDER - HOSPITAL COURSE
89 y/o male with a PMHx of DM2, HTN, HLD, recently diagnosed stage 4 pancreatic cancer presents to the ED for failure to thrive. Pt with increasing fatigue and decreased PO intake over the last few days. No reported fever, vomiting, diarrhea, pain. found to have mets to the lung and liver. also with a contained gastroduodenal perforation.   Patient going home on hospice.

## 2024-07-10 ENCOUNTER — TRANSCRIPTION ENCOUNTER (OUTPATIENT)
Age: 89
End: 2024-07-10

## 2024-07-10 VITALS
HEART RATE: 69 BPM | SYSTOLIC BLOOD PRESSURE: 113 MMHG | DIASTOLIC BLOOD PRESSURE: 49 MMHG | TEMPERATURE: 96 F | OXYGEN SATURATION: 93 % | RESPIRATION RATE: 20 BRPM

## 2024-07-10 LAB
CULTURE RESULTS: SIGNIFICANT CHANGE UP
CULTURE RESULTS: SIGNIFICANT CHANGE UP
SPECIMEN SOURCE: SIGNIFICANT CHANGE UP
SPECIMEN SOURCE: SIGNIFICANT CHANGE UP

## 2024-07-10 PROCEDURE — 99239 HOSP IP/OBS DSCHRG MGMT >30: CPT

## 2024-07-10 RX ORDER — AMLODIPINE BESYLATE AND OLMESARTRAN MEDOXOMIL 10; 40 MG/1; MG/1
1 TABLET, FILM COATED ORAL
Refills: 0 | DISCHARGE

## 2024-07-10 RX ORDER — GABAPENTIN
1 POWDER (GRAM) MISCELLANEOUS
Refills: 0 | DISCHARGE

## 2024-07-10 RX ORDER — SODIUM ZIRCONIUM CYCLOSILICATE 10 G/10G
5 POWDER, FOR SUSPENSION ORAL
Refills: 0 | DISCHARGE

## 2024-07-10 RX ORDER — OMEPRAZOLE 10 MG/1
1 CAPSULE, DELAYED RELEASE ORAL
Refills: 0 | DISCHARGE

## 2024-07-10 RX ORDER — FERROUS SULFATE 325(65) MG
1 TABLET ORAL
Refills: 0 | DISCHARGE

## 2024-07-10 RX ORDER — OLANZAPINE 2.5 MG/1
1 TABLET, FILM COATED ORAL
Refills: 0 | DISCHARGE

## 2024-07-10 RX ORDER — CARVEDILOL PHOSPHATE 80 MG/1
1 CAPSULE, EXTENDED RELEASE ORAL
Refills: 0 | DISCHARGE

## 2024-07-10 RX ADMIN — PIPERACILLIN SODIUM AND TAZOBACTAM SODIUM 25 GRAM(S): 3; .375 INJECTION, POWDER, LYOPHILIZED, FOR SOLUTION INTRAVENOUS at 05:55

## 2024-07-10 NOTE — DISCHARGE NOTE NURSING/CASE MANAGEMENT/SOCIAL WORK - PATIENT PORTAL LINK FT
You can access the FollowMyHealth Patient Portal offered by [facility] by registering at the following website: [email or web address]. By joining [facility]s FollowMyHealth portal, you will also be able to view your health information using other applications (apps) compatible with our system.

## 2024-09-12 ENCOUNTER — APPOINTMENT (OUTPATIENT)
Dept: OPHTHALMOLOGY | Facility: CLINIC | Age: 89
End: 2024-09-12